# Patient Record
Sex: FEMALE | Race: WHITE | Employment: OTHER | URBAN - METROPOLITAN AREA
[De-identification: names, ages, dates, MRNs, and addresses within clinical notes are randomized per-mention and may not be internally consistent; named-entity substitution may affect disease eponyms.]

---

## 2018-09-20 ENCOUNTER — OFFICE VISIT (OUTPATIENT)
Dept: ORTHOPEDIC SURGERY | Age: 68
End: 2018-09-20

## 2018-09-20 VITALS
WEIGHT: 235 LBS | BODY MASS INDEX: 34.8 KG/M2 | HEIGHT: 69 IN | SYSTOLIC BLOOD PRESSURE: 134 MMHG | HEART RATE: 82 BPM | DIASTOLIC BLOOD PRESSURE: 61 MMHG

## 2018-09-20 DIAGNOSIS — M25.552 ACUTE HIP PAIN, LEFT: ICD-10-CM

## 2018-09-20 DIAGNOSIS — M25.512 ACUTE PAIN OF LEFT SHOULDER: Primary | ICD-10-CM

## 2018-09-20 PROCEDURE — 3017F COLORECTAL CA SCREEN DOC REV: CPT | Performed by: ORTHOPAEDIC SURGERY

## 2018-09-20 PROCEDURE — G8400 PT W/DXA NO RESULTS DOC: HCPCS | Performed by: ORTHOPAEDIC SURGERY

## 2018-09-20 PROCEDURE — 1101F PT FALLS ASSESS-DOCD LE1/YR: CPT | Performed by: ORTHOPAEDIC SURGERY

## 2018-09-20 PROCEDURE — 99213 OFFICE O/P EST LOW 20 MIN: CPT | Performed by: ORTHOPAEDIC SURGERY

## 2018-09-20 PROCEDURE — 1123F ACP DISCUSS/DSCN MKR DOCD: CPT | Performed by: ORTHOPAEDIC SURGERY

## 2018-09-20 PROCEDURE — 1036F TOBACCO NON-USER: CPT | Performed by: ORTHOPAEDIC SURGERY

## 2018-09-20 PROCEDURE — 4040F PNEUMOC VAC/ADMIN/RCVD: CPT | Performed by: ORTHOPAEDIC SURGERY

## 2018-09-20 PROCEDURE — G8427 DOCREV CUR MEDS BY ELIG CLIN: HCPCS | Performed by: ORTHOPAEDIC SURGERY

## 2018-09-20 PROCEDURE — 1090F PRES/ABSN URINE INCON ASSESS: CPT | Performed by: ORTHOPAEDIC SURGERY

## 2018-09-20 PROCEDURE — G8417 CALC BMI ABV UP PARAM F/U: HCPCS | Performed by: ORTHOPAEDIC SURGERY

## 2018-09-20 RX ORDER — LORATADINE 10 MG/1
10 CAPSULE, LIQUID FILLED ORAL DAILY
COMMUNITY
End: 2021-03-30

## 2018-09-20 RX ORDER — FUROSEMIDE 20 MG/1
20 TABLET ORAL DAILY
Qty: 7 TABLET | Refills: 1 | Status: SHIPPED | OUTPATIENT
Start: 2018-09-20 | End: 2019-08-13

## 2018-09-20 RX ORDER — TERBINAFINE HYDROCHLORIDE 250 MG/1
250 TABLET ORAL DAILY
COMMUNITY
End: 2020-08-25

## 2018-09-20 RX ORDER — OXYCODONE HYDROCHLORIDE AND ACETAMINOPHEN 5; 325 MG/1; MG/1
1 TABLET ORAL EVERY 6 HOURS PRN
Qty: 28 TABLET | Refills: 0 | Status: SHIPPED | OUTPATIENT
Start: 2018-09-20 | End: 2018-09-27

## 2018-09-20 RX ORDER — FLUOXETINE HYDROCHLORIDE 20 MG/1
40 CAPSULE ORAL DAILY
COMMUNITY
End: 2021-03-30

## 2018-09-20 RX ORDER — ARIPIPRAZOLE 2 MG/1
2 TABLET ORAL DAILY
COMMUNITY

## 2018-09-20 RX ORDER — LEVOTHYROXINE SODIUM 0.05 MG/1
50 TABLET ORAL DAILY
COMMUNITY

## 2018-09-20 NOTE — PROGRESS NOTES
Date:  2018    Name:  Danae Rosales    :  1950      Age:   80 y.o. Reason for Visit:     Arm Pain (Left humerus)      HPI: Danae Rosales is a 80 y.o. female here today for fall during cruise onto [place] shoulder and the left hip. Left hip with increase in pain since the trip on plane and through airport. Pain in the left shoudler associated with marked bruising. Pain in the left arm not assoicated with nerologic issues or head injry nor loc. .         Review of Systems:  Review of Systems   Cardiovascular: Positive for leg swelling. Negative for chest pain and palpitations. Genitourinary: Positive for flank pain. Right sided anterior chest pain probably from fall. Musculoskeletal: Positive for falls, joint pain, myalgias and neck pain. Review of Systems   Constitutional: Negative for chills and fever. HENT: Negative for nosebleeds. Eyes: Negative for double vision. Cardiovascular: Negative for chest pain. Gastrointestinal: Negative for abdominal pain. Musculoskeletal: Positive for joint pain and myalgias. Skin: Negative for rash. Neurological: Negative for seizures. Psychiatric/Behavioral: Negative for hallucinations.        Past History:  Past Medical History:   Diagnosis Date    Risk for falls 14    S/P tubal ligation 1990     Past Surgical History:   Procedure Laterality Date    FRACTURE SURGERY Bilateral 2008     Current Outpatient Prescriptions on File Prior to Visit   Medication Sig Dispense Refill    HYDROcodone-acetaminophen (NORCO) 5-325 MG per tablet Take 1 tablet by mouth 2 times daily as needed for Pain (ONE PO BID X 1WK AND ONE PO QD X  1WK) 21 tablet 0    HYDROcodone-acetaminophen (NORCO) 5-325 MG per tablet Take 1 tablet by mouth three times daily [facility] RX [id number] 90 tablet 0    HYDROcodone-acetaminophen (NORCO) 5-325 MG per tablet Take 1 tablet by mouth three times daily [facility] RX [id number]  Fill after 16 90 tablet 0

## 2018-09-25 ENCOUNTER — TELEPHONE (OUTPATIENT)
Dept: ORTHOPEDIC SURGERY | Age: 68
End: 2018-09-25

## 2018-09-27 ENCOUNTER — OFFICE VISIT (OUTPATIENT)
Dept: ORTHOPEDIC SURGERY | Age: 68
End: 2018-09-27

## 2018-09-27 VITALS
BODY MASS INDEX: 34.8 KG/M2 | HEART RATE: 82 BPM | HEIGHT: 69 IN | WEIGHT: 235 LBS | DIASTOLIC BLOOD PRESSURE: 59 MMHG | SYSTOLIC BLOOD PRESSURE: 134 MMHG

## 2018-09-27 DIAGNOSIS — M25.512 ACUTE PAIN OF LEFT SHOULDER: Primary | ICD-10-CM

## 2018-09-27 DIAGNOSIS — M25.552 ACUTE HIP PAIN, LEFT: ICD-10-CM

## 2018-09-27 PROCEDURE — 99024 POSTOP FOLLOW-UP VISIT: CPT | Performed by: ORTHOPAEDIC SURGERY

## 2018-09-27 RX ORDER — OXYCODONE HYDROCHLORIDE AND ACETAMINOPHEN 5; 325 MG/1; MG/1
1 TABLET ORAL EVERY 6 HOURS PRN
Qty: 28 TABLET | Refills: 0 | Status: SHIPPED | OUTPATIENT
Start: 2018-09-27 | End: 2018-10-04

## 2018-09-27 NOTE — PROGRESS NOTES
no guarding. Musculoskeletal: Patient exhibits tenderness and pain. Neurological: Patient is alert and oriented to person, place, and time. Skin: Skin is warm. Psychiatric: Affect normal.     Diagnostics:  XR SHOULDER LEFT (MIN 2 VIEWS)   Final Result      XR HIP 2-3 VW W PELVIS LEFT   Final Result          Assessment/Plan:   1. Acute pain of left shoulder   3 views of the left shoudler reviewed AP lateral and axillary views were reviewed and revealed severe osteoarrthritis and fracture of the greater troch and surgical neck. Min displaced. Slight lateral displacement of distal fragment compared to previous visit films    - XR SHOULDER LEFT (MIN 3 VIEWS)  - XR HIP LEFT (2-3 VIEWS)  - oxyCODONE-acetaminophen (PERCOCET) 5-325 MG per tablet; Take 1 tablet by mouth every 6 hours as needed for Pain for up to 7 days. Intended supply: 7 days. Take lowest dose possible to manage pain. Dispense: 28 tablet; Refill: 0  - furosemide (LASIX) 20 MG tablet; Take 1 tablet by mouth daily  Dispense: 7 tablet; Refill: 1  -follow-up in one week for repeat films    2. Acute hip pain, left   two views of the left hip AP and lateral with signs of moderate osteaorthritis, no sign of fracture or dislocation. Right toal hip appears to be doing well overall  - XR HIP LEFT (2-3 VIEWS)  - oxyCODONE-acetaminophen (PERCOCET) 5-325 MG per tablet; Take 1 tablet by mouth every 6 hours as needed for Pain for up to 7 days. Intended supply: 7 days. Take lowest dose possible to manage pain. Dispense: 28 tablet; Refill: 0  - furosemide (LASIX) 20 MG tablet; Take 1 tablet by mouth daily  Dispense: 7 tablet; Refill: 1  -follow-up in one week for repeat films    Plan for pendulum esercises. Gentle rom as tolerated recheck in another week with repat xrays. Hip and ribs appear to be doing well. Left leg with erythema  localized area appears more related to the swelling then infection and has decreased since last visit.      Distal

## 2018-09-28 ENCOUNTER — TELEPHONE (OUTPATIENT)
Dept: ORTHOPEDIC SURGERY | Age: 68
End: 2018-09-28

## 2018-10-01 RX ORDER — IBUPROFEN 800 MG/1
800 TABLET ORAL EVERY 6 HOURS PRN
Qty: 90 TABLET | Refills: 1 | Status: SHIPPED | OUTPATIENT
Start: 2018-10-01 | End: 2018-12-02 | Stop reason: SDUPTHER

## 2018-10-04 ENCOUNTER — OFFICE VISIT (OUTPATIENT)
Dept: ORTHOPEDIC SURGERY | Age: 68
End: 2018-10-04
Payer: MEDICARE

## 2018-10-04 VITALS
SYSTOLIC BLOOD PRESSURE: 129 MMHG | DIASTOLIC BLOOD PRESSURE: 52 MMHG | BODY MASS INDEX: 34.8 KG/M2 | HEART RATE: 92 BPM | HEIGHT: 69 IN | WEIGHT: 235 LBS

## 2018-10-04 DIAGNOSIS — S42.322D CLOSED DISPLACED TRANSVERSE FRACTURE OF SHAFT OF LEFT HUMERUS WITH ROUTINE HEALING, SUBSEQUENT ENCOUNTER: Primary | ICD-10-CM

## 2018-10-04 DIAGNOSIS — M25.512 ACUTE PAIN OF LEFT SHOULDER: ICD-10-CM

## 2018-10-04 PROCEDURE — G8400 PT W/DXA NO RESULTS DOC: HCPCS | Performed by: PHYSICIAN ASSISTANT

## 2018-10-04 PROCEDURE — 99214 OFFICE O/P EST MOD 30 MIN: CPT | Performed by: PHYSICIAN ASSISTANT

## 2018-10-04 PROCEDURE — G8417 CALC BMI ABV UP PARAM F/U: HCPCS | Performed by: PHYSICIAN ASSISTANT

## 2018-10-04 PROCEDURE — 1090F PRES/ABSN URINE INCON ASSESS: CPT | Performed by: PHYSICIAN ASSISTANT

## 2018-10-04 PROCEDURE — 4040F PNEUMOC VAC/ADMIN/RCVD: CPT | Performed by: PHYSICIAN ASSISTANT

## 2018-10-04 PROCEDURE — G8427 DOCREV CUR MEDS BY ELIG CLIN: HCPCS | Performed by: PHYSICIAN ASSISTANT

## 2018-10-04 PROCEDURE — 1036F TOBACCO NON-USER: CPT | Performed by: PHYSICIAN ASSISTANT

## 2018-10-04 PROCEDURE — 1101F PT FALLS ASSESS-DOCD LE1/YR: CPT | Performed by: PHYSICIAN ASSISTANT

## 2018-10-04 PROCEDURE — 3017F COLORECTAL CA SCREEN DOC REV: CPT | Performed by: PHYSICIAN ASSISTANT

## 2018-10-04 PROCEDURE — G8484 FLU IMMUNIZE NO ADMIN: HCPCS | Performed by: PHYSICIAN ASSISTANT

## 2018-10-04 PROCEDURE — 1123F ACP DISCUSS/DSCN MKR DOCD: CPT | Performed by: PHYSICIAN ASSISTANT

## 2018-10-04 RX ORDER — OXYCODONE HYDROCHLORIDE AND ACETAMINOPHEN 5; 325 MG/1; MG/1
1 TABLET ORAL EVERY 6 HOURS PRN
Qty: 28 TABLET | Refills: 0 | Status: SHIPPED | OUTPATIENT
Start: 2018-10-04 | End: 2018-10-15 | Stop reason: SDUPTHER

## 2018-10-04 NOTE — PROGRESS NOTES
Flexion: abnormal   External Rotation: abnormal   Internal Rotation 0 degrees: abnormal   Internal Rotation 90 degrees: abnormal     Muscle Strength   Right shoulder normal muscle strength: poor ffunction secondary to pain in the shoduler. Other   Erythema: present  Scars: absent  Sensation: normal  Pulse: present    Comments:  Difficulty with use of the arm but able to do some pendulum exercises. Left Shoulder Exam   Left shoulder exam is normal.               Physical Exam  Physical Exam   Constitutional: Patient is oriented to person, place, and time and well-developed, well-nourished, and in no distress. HENT:   Head: Normocephalic and atraumatic. Eyes: Pupils are equal, round, and reactive to light. Neck: No tracheal deviation present. No thyromegaly present. Pulmonary/Chest: Effort normal.   Abdominal: Soft. There is no guarding. Musculoskeletal: Patient exhibits tenderness and pain. Neurological: Patient is alert and oriented to person, place, and time. Skin: Skin is warm. Psychiatric: Affect normal.     Diagnostics:  XR SHOULDER LEFT (MIN 2 VIEWS)   Final Result          Assessment/Plan:   1. Acute pain of left shoulder   3 views of the left shoudler reviewed AP lateral and axillary views were reviewed and revealed severe osteoarrthritis and fracture of the greater troch and surgical neck. Min displaced. Slight Increase in lateral displacement of distal fragment compared to previous visit films from 9/27/18    - XR SHOULDER LEFT (MIN 3 VIEWS)    - oxyCODONE-acetaminophen (PERCOCET) 5-325 MG per tablet; Take 1 tablet by mouth every 6 hours as needed for Pain for up to 7 days. Intended supply: 7 days. Take lowest dose possible to manage pain. Dispense: 28 tablet; Refill: 0  - furosemide (LASIX) 20 MG tablet; Take 1 tablet by mouth daily  Dispense: 7 tablet;  Refill: 1  -follow-up in one week for repeat films  -referral placed for evaluation and treatment with Dr. Ashley Blake  -Plan for

## 2018-10-09 ENCOUNTER — OFFICE VISIT (OUTPATIENT)
Dept: ORTHOPEDIC SURGERY | Age: 68
End: 2018-10-09
Payer: MEDICARE

## 2018-10-09 VITALS — WEIGHT: 235 LBS | HEIGHT: 69 IN | BODY MASS INDEX: 34.8 KG/M2

## 2018-10-09 DIAGNOSIS — M25.512 ACUTE PAIN OF LEFT SHOULDER: ICD-10-CM

## 2018-10-09 DIAGNOSIS — S42.222B: Primary | ICD-10-CM

## 2018-10-09 PROCEDURE — 3017F COLORECTAL CA SCREEN DOC REV: CPT | Performed by: ORTHOPAEDIC SURGERY

## 2018-10-09 PROCEDURE — G8484 FLU IMMUNIZE NO ADMIN: HCPCS | Performed by: ORTHOPAEDIC SURGERY

## 2018-10-09 PROCEDURE — 99214 OFFICE O/P EST MOD 30 MIN: CPT | Performed by: ORTHOPAEDIC SURGERY

## 2018-10-09 PROCEDURE — G8417 CALC BMI ABV UP PARAM F/U: HCPCS | Performed by: ORTHOPAEDIC SURGERY

## 2018-10-09 PROCEDURE — G8427 DOCREV CUR MEDS BY ELIG CLIN: HCPCS | Performed by: ORTHOPAEDIC SURGERY

## 2018-10-09 PROCEDURE — 1101F PT FALLS ASSESS-DOCD LE1/YR: CPT | Performed by: ORTHOPAEDIC SURGERY

## 2018-10-09 PROCEDURE — 1090F PRES/ABSN URINE INCON ASSESS: CPT | Performed by: ORTHOPAEDIC SURGERY

## 2018-10-10 ENCOUNTER — TELEPHONE (OUTPATIENT)
Dept: ORTHOPEDIC SURGERY | Age: 68
End: 2018-10-10

## 2018-10-10 DIAGNOSIS — S42.202A CLOSED FRACTURE OF PROXIMAL END OF LEFT HUMERUS, UNSPECIFIED FRACTURE MORPHOLOGY, INITIAL ENCOUNTER: Primary | ICD-10-CM

## 2018-10-11 ENCOUNTER — HOSPITAL ENCOUNTER (OUTPATIENT)
Dept: PHYSICAL THERAPY | Age: 68
Setting detail: THERAPIES SERIES
Discharge: HOME | End: 2018-10-11
Payer: MEDICARE

## 2018-10-11 PROCEDURE — 97110 THERAPEUTIC EXERCISES: CPT | Performed by: PHYSICAL THERAPIST

## 2018-10-11 PROCEDURE — G8985 CARRY GOAL STATUS: HCPCS | Performed by: PHYSICAL THERAPIST

## 2018-10-11 PROCEDURE — 97112 NEUROMUSCULAR REEDUCATION: CPT | Performed by: PHYSICAL THERAPIST

## 2018-10-11 PROCEDURE — G8984 CARRY CURRENT STATUS: HCPCS | Performed by: PHYSICAL THERAPIST

## 2018-10-11 PROCEDURE — 97161 PT EVAL LOW COMPLEX 20 MIN: CPT | Performed by: PHYSICAL THERAPIST

## 2018-10-11 NOTE — PLAN OF CARE
(): At least 1 percent but less than 20 percent impaired, limited or restricted    Pain Scale: 0-6/10  Easing factors: ibuprofen 800, at night percocet, ice  Provocative factors: reaching, pushing,     Type: []Constant   [x]Intermittent  [x]Radiating to elbow []Localized []other:     Numbness/Tingling: none     Occupation/School:retired     Living Status/Prior Level of Function: Independent with ADLs and IADLs, self care, do own hair    OBJECTIVE:     R handed    ROM 10/11 PROM AROM  Comment    L R L R    Flexion ~110 ~160      Abduction ~95 ~160      ER ~20 at 80abd ~90      IR [place]@[employer] ~25@80Abd      Other(cervical)        Other             Strength not tested 10/11 L R Comment   Flexion      Abduction      ER      IR      Supraspinatus      Upper Trap      Lower Trap      Mid Trap      Rhomboids      Biceps      Triceps      Horizontal Abduction      Horizontal Adduction      Lats        Special Tests Results/Comment   Senait    Neers    Speeds    OBriens    Other    Neurologic Signs Neg 10/11   Functional Reach      Reflexes/Sensation:    [x]Dermatomes/Myotomes intact 10/11   []Reflexes equal and normal bilaterally   []Other:    Joint mobility:    []Normal    []Hypo   []Hyper    Palpation: general TTP L shoulder area 10/11    Functional Mobility/Transfers: increased timing and some compensations due to L shoulder and hip injury from recent fall 10/11    Posture: protective posture L UE and L LE due to recent fall and injuries sustained. 10/11    Bandages/Dressings/Incisions: none 10/11    Gait: (include devices/WB status): antalgic with straight cane use due to L hip injury sustained with the recent fall. 10/11                         [x] Patient history, allergies, meds reviewed. Medical chart reviewed. See intake form. 10/11     Review Of Systems (ROS):  [x]Performed Review of systems (Integumentary, CardioPulmonary, Neurological) by intake and observation.  Intake form has been scanned into medical record. Patient has been instructed to contact their primary care physician regarding ROS issues if not already being addressed at this time. Co-morbidities/Complexities (which will affect course of rehabilitation): 10/11  []None           Arthritic conditions   []Rheumatoid arthritis (M05.9)  [x]Osteoarthritis (M19.91)   Cardiovascular conditions   []Hypertension (I10)  []Hyperlipidemia (E78.5)  []Angina pectoris (I20)  []Atherosclerosis (I70)   Musculoskeletal conditions   []Disc pathology   []Congenital spine pathologies   []Prior surgical intervention  []Osteoporosis (M81.8)  []Osteopenia (M85.8)   Endocrine conditions   []Hypothyroid (E03.9)  []Hyperthyroid Gastrointestinal conditions   []Constipation (B16.24)   Metabolic conditions   []Morbid obesity (E66.01)  [x]Diabetes type 1(E10.65) or 2 (E11.65)   []Neuropathy (G60.9)     Pulmonary conditions   []Asthma (J45)  []Coughing   []COPD (J44.9)   Psychological Disorders  []Anxiety (F41.9)  []Depression (F32.9)   []Other:   []Other:          Barriers to/and or personal factors that will affect rehab potential:              [x]Age  [x]Sex              [x]Motivation/Lack of Motivation                        [x]Co-Morbidities              []Cognitive Function, education/learning barriers              []Environmental, home barriers              []profession/work barriers  [x]past PT/medical experience  []other:  Justification: above factors may affect rehab potential    Falls Risk Assessment (30 days): not tested. Pt to continue use of straight cane until walking is back to PLOF 10/11  [] Falls Risk assessed and no intervention required.   [] Falls Risk assessed and Patient requires intervention due to being higher risk   TUG score (>12s at risk):     [] Falls education provided, including       G-Codes:  PT G-Codes  Functional Assessment Tool Used: UEFS  Score: 84%  Functional Limitation: Carrying, moving and handling objects  Carrying, Moving and

## 2018-10-11 NOTE — FLOWSHEET NOTE
Biceps     Triceps          PRE's     Flexion     Abduction     External Rotation     Internal Rotation     Shrugs     EXT     Reverse Flys     Serratus     Horizontal Abd with ER     Biceps     Triceps     Retraction          Cable Column/Theraband     External Rotation     Internal Rotation     Shrugs     Lats     Ext     Flex     Scapular Retraction     BIC     TRIC     PNF          Dynamic Stability          Plyoback          Manual interventions                     Therapeutic Exercise and NMR EXR  [x] (09696) Provided verbal/tactile cueing for activities related to strengthening, flexibility, endurance, ROM  for improvements in scapular, scapulothoracic and UE control with self care, reaching, carrying, lifting, house/yardwork, driving/computer work.    [] (70095) Provided verbal/tactile cueing for activities related to improving balance, coordination, kinesthetic sense, posture, motor skill, proprioception  to assist with  scapular, scapulothoracic and UE control with self care, reaching, carrying, lifting, house/yardwork, driving/computer work. Therapeutic Activities:    [] (19526 or 29442) Provided verbal/tactile cueing for activities related to improving balance, coordination, kinesthetic sense, posture, motor skill, proprioception and motor activation to allow for proper function of scapular, scapulothoracic and UE control with self care, carrying, lifting, driving/computer work.      Home Exercise Program:    [x] (16011) Reviewed/Progressed HEP activities related to strengthening, flexibility, endurance, ROM of scapular, scapulothoracic and UE control with self care, reaching, carrying, lifting, house/yardwork, driving/computer work  [] (43553) Reviewed/Progressed HEP activities related to improving balance, coordination, kinesthetic sense, posture, motor skill, proprioception of scapular, scapulothoracic and UE control with self care, reaching, carrying, lifting, house/yardwork, driving/computer work      Manual Treatments:  PROM / STM / Oscillations-Mobs:  G-I, II, III, IV (PA's, Inf., Post.)  [] (29248) Provided manual therapy to mobilize soft tissue/joints of cervical/CT, scapular GHJ and UE for the purpose of modulating pain, promoting relaxation,  increasing ROM, reducing/eliminating soft tissue swelling/inflammation/restriction, improving soft tissue extensibility and allowing for proper ROM for normal function with self care, reaching, carrying, lifting, house/yardwork, driving/computer work    Modalities:      Charges:  Timed Code Treatment Minutes: 30   Total Treatment Minutes: 60       [x] EVAL (LOW) 19449 (typically 20 minutes face-to-face)  [] EVAL (MOD) 29048 (typically 30 minutes face-to-face)  [] EVAL (HIGH) 45177 (typically 45 minutes face-to-face)  [] RE-EVAL     [x] SO(54023) x  1   [] IONTO  [x] NMR (66151) x  1   [] VASO  [] Manual (86678) x       [] Other:  [] TA x       [] Mech Traction (43220)  [] ES(attended) (23434)      [] ES (un) (23971):     GOALS:  Patient stated goal: increase shoulder ROM and strength, able to due her hair      Therapist goals for Patient:   Short Term Goals: To be achieved in: 2 weeks  1. Independent in HEP and progression per patient tolerance, in order to prevent re-injury. 2. Patient will have a decrease in pain to facilitate improvement in movement, function, and ADLs as indicated by Functional Deficits.     Long Term Goals: To be achieved in:   1. Disability index score of 50% or less for the UEFS to assist with reaching prior level of function. 4-6 weeks 20% 12 weeks  2. Patient will demonstrate increased AROM to [facility] to allow for proper joint functioning as indicated by patients Functional Deficits. 8-12 weeks  3. Patient will demonstrate an increase in Strength to good scapular and core control  for UE to allow for proper functional mobility as indicated by patients Functional Deficits. 8-12 weeks  4.  Patient will return to  functional activities

## 2018-10-12 ENCOUNTER — TELEPHONE (OUTPATIENT)
Dept: ORTHOPEDIC SURGERY | Age: 68
End: 2018-10-12

## 2018-10-12 DIAGNOSIS — S42.322D CLOSED DISPLACED TRANSVERSE FRACTURE OF SHAFT OF LEFT HUMERUS WITH ROUTINE HEALING, SUBSEQUENT ENCOUNTER: ICD-10-CM

## 2018-10-15 RX ORDER — OXYCODONE HYDROCHLORIDE AND ACETAMINOPHEN 5; 325 MG/1; MG/1
1 TABLET ORAL EVERY 6 HOURS PRN
Qty: 28 TABLET | Refills: 0 | Status: SHIPPED | OUTPATIENT
Start: 2018-10-15 | End: 2018-10-22

## 2018-10-17 ENCOUNTER — HOSPITAL ENCOUNTER (OUTPATIENT)
Dept: PHYSICAL THERAPY | Age: 68
Setting detail: THERAPIES SERIES
Discharge: HOME | End: 2018-10-17
Payer: MEDICARE

## 2018-10-17 PROCEDURE — 97112 NEUROMUSCULAR REEDUCATION: CPT

## 2018-10-17 PROCEDURE — 97110 THERAPEUTIC EXERCISES: CPT

## 2018-10-17 NOTE — FLOWSHEET NOTE
Functional goals:  [] Patient is progressing as expected towards functional goals listed. [] Progression is slowed due to complexities listed. [] Progression has been slowed due to co-morbidities. [x] Plan just implemented, too soon to assess goals progression  [] Other:     ASSESSMENT:  See eval    Treatment/Activity Tolerance:  [x] Patient tolerated treatment fair to well [] Patient limited by fatique  [x] Patient limited by pain  [] Patient limited by other medical complications  [x] Other: 10/17 - somewhat limited by pain this date, however, able to complete all exercises. Held supine flexion to 90d d/t pain when moving past 90d of flexion. Began ER AAROM and pulleys for AAROM into flexion and abduction. Patient education: 10/11 Reviewed diagnosis, POC, HEP and its importance. Prognosis: [x] Good for established goals  [] Fair  [] Poor    Patient Requires Follow-up: [x] Yes  [] No    PLAN: 1-2 days per week for 4 Weeks:10/11-11/11/2018  [x] Continue per plan of care [] Alter current plan (see comments)  [] Plan of care initiated [] Hold pending MD visit [] Discharge    Electronically signed by: Dominga Alva PT,   *If patient does not return for further follow ups after this date. Please consider this as the patients discharge from physical therapy.

## 2018-10-20 NOTE — PROGRESS NOTES
(CLARITIN) 10 MG capsule Take 10 mg by mouth daily      FLUoxetine (PROZAC) 20 MG capsule Take 20 mg by mouth daily      lisdexamfetamine (VYVANSE) 50 MG capsule Take 50 mg by mouth every morning. Arty Reap ARIPiprazole (ABILIFY) 2 MG tablet Take 2 mg by mouth daily      terbinafine (LAMISIL) 250 MG tablet Take 250 mg by mouth daily      levothyroxine (SYNTHROID) 50 MCG tablet Take 50 mcg by mouth Daily      furosemide (LASIX) 20 MG tablet Take 1 tablet by mouth daily 7 tablet 1    HYDROcodone-acetaminophen (NORCO) 5-325 MG per tablet Take 1 tablet by mouth 2 times daily as needed for Pain (ONE PO BID X 1WK AND ONE PO QD X  1WK) 21 tablet 0    HYDROcodone-acetaminophen (NORCO) 5-325 MG per tablet Take 1 tablet by mouth three times daily [facility] RX [id number] 90 tablet 0    HYDROcodone-acetaminophen (NORCO) 5-325 MG per tablet Take 1 tablet by mouth three times daily [facility] RX [id number]  Fill after 6/9/16 90 tablet 0     No current facility-administered medications on file prior to visit. Review of Systems:  Pertinent items are noted in HPI  Review of systems reviewed from Patient History Form dated on Today October 19, 2018 and available in the patient's chart under the Media tab. Vital Signs: There were no vitals filed for this visit. General Exam:   Constitutional: Patient is adequately groomed with no evidence of malnutrition  Mental Status: The patient is oriented to time, place and person. The patient's mood and affect are appropriate. Neurological: The patient has good coordination. There is no weakness or sensory deficit. She appears her stated age and is in no apparent distress. Left Shoulder Examination:    Inspection: The arm is removed out of the sling. There is no deformity or residual bruising. No signs of any acute trauma. Palpation:  No crepitation with gentle range of motion. Tenderness over the tuberosity. There is tenderness over the posterior joint line.     Active Range of

## 2018-10-22 ENCOUNTER — HOSPITAL ENCOUNTER (OUTPATIENT)
Dept: PHYSICAL THERAPY | Age: 68
Setting detail: THERAPIES SERIES
Discharge: HOME | End: 2018-10-22
Payer: MEDICARE

## 2018-10-22 PROCEDURE — 97110 THERAPEUTIC EXERCISES: CPT

## 2018-10-22 PROCEDURE — 97112 NEUROMUSCULAR REEDUCATION: CPT

## 2018-10-22 NOTE — FLOWSHEET NOTE
[facility] [address] [place] [address]  Phone: [phone]   Fax:     [phone]    Physical Therapy Daily Treatment Note  Date:  10/22/2018    Patient Name:  Susi Morris    :  1950  MRN: [id number]  Restrictions/Precautions:    Medical/Treatment Diagnosis Information:  Diagnosis: U52.094A (ICD-10-CM) - Closed fracture of proximal end of left humerus, unspecified fracture morphology, initial encounter  Treatment Diagnosis: L shoulder pain, L shoulder decreased ROM and strength  Insurance/Certification information:  PT Insurance Information: Medicare  Physician Information:  Referring Practitioner: Siri Kussmaul of care signed (Y/N):     Date of Patient follow up with Physician:     G-Code (if applicable):      Date G-Code Applied:  10/11 84%       Progress Note:   Next due by: Visit #10  Or week of 2018     Latex Allergy:  [x]NO      []YEs  Preferred Language for Healthcare:   [x]English       []other:    Visit # Insurance Allowable   3 medicare     Pain level: At eval - 0-6      10/22 - 2/10    SUBJECTIVE:    10/22 - every since Wednesday since she was doing her exercises, her arm hurt a lot. She has continued her pendulums and given the ER a break because she feels like this made her shoulder hurt worse.      OR handed             ROM 10/11 PROM AROM  Comment     L R L R     Flexion ~110 ~160         Abduction ~95 ~160         ER ~20 at 80abd ~90         IR [place]@[employer] ~25@54LOT         Other(cervical)             Other                    Strength not tested 10/11 L R Comment   Flexion         Abduction         ER         IR         Supraspinatus         Upper Trap         Lower Trap         Mid Trap         Rhomboids         Biceps         Triceps         Horizontal Abduction         Horizontal Adduction         Lats            Special Tests Results/Comment   Senait Schaefer

## 2018-10-25 ENCOUNTER — HOSPITAL ENCOUNTER (OUTPATIENT)
Dept: PHYSICAL THERAPY | Age: 68
Setting detail: THERAPIES SERIES
Discharge: HOME | End: 2018-10-25
Payer: MEDICARE

## 2018-10-25 PROCEDURE — 97110 THERAPEUTIC EXERCISES: CPT

## 2018-10-25 PROCEDURE — 97112 NEUROMUSCULAR REEDUCATION: CPT

## 2018-11-06 ENCOUNTER — HOSPITAL ENCOUNTER (OUTPATIENT)
Dept: PHYSICAL THERAPY | Age: 68
Setting detail: THERAPIES SERIES
Discharge: HOME | End: 2018-11-06
Payer: MEDICARE

## 2018-11-06 PROCEDURE — 97110 THERAPEUTIC EXERCISES: CPT

## 2018-11-06 PROCEDURE — 97112 NEUROMUSCULAR REEDUCATION: CPT

## 2018-11-06 NOTE — FLOWSHEET NOTE
[address], [address] [place] [address]  Phone: [phone]   Fax:     [phone]    Physical Therapy Daily Treatment Note  Date:  2018    Patient Name:  Siobhan Coleman    :  1950  MRN: [id number]  Restrictions/Precautions:    Medical/Treatment Diagnosis Information:  Diagnosis: R65.384M (ICD-10-CM) - Closed fracture of proximal end of left humerus, unspecified fracture morphology, initial encounter  Treatment Diagnosis: L shoulder pain, L shoulder decreased ROM and strength  Insurance/Certification information:  PT Insurance Information: Medicare  Physician Information:  Referring Practitioner: Caleb Posey of care signed (Y/N):     Date of Patient follow up with Physician:     G-Code (if applicable):      Date G-Code Applied:  10/11 84%       Progress Note:   Next due by: Visit #10  Or week of 2018     Latex Allergy:  [x]NO      []YEs  Preferred Language for Healthcare:   [x]English       []other:    Visit # Insurance Allowable   5 medicare     Pain level: At eval - 0-6       - 2/10    SUBJECTIVE:       - had a long drive to [place] over the weekend. This may have flared up her shoulder a little bit.      OR handed             ROM 10/11 PROM AROM  Comment     L R L R     Flexion ~110 ~160         Abduction ~95 ~160         ER ~20 at 80abd ~90         IR [place]@[employer] ~52@77PIK         Other(cervical)             Other                    Strength not tested 10/11 L R Comment   Flexion         Abduction         ER         IR         Supraspinatus         Upper Trap         Lower Trap         Mid Trap         Rhomboids         Biceps         Triceps         Horizontal Abduction         Horizontal Adduction         Lats            Special Tests Results/Comment   Senait     Neers     Speeds     OBriens     Other     Neurologic Signs Neg 10/11   Functional Reach        Reflexes/Sensation:

## 2018-11-07 ENCOUNTER — OFFICE VISIT (OUTPATIENT)
Dept: ORTHOPEDIC SURGERY | Age: 68
End: 2018-11-07
Payer: MEDICARE

## 2018-11-07 VITALS
HEIGHT: 69 IN | WEIGHT: 235 LBS | SYSTOLIC BLOOD PRESSURE: 113 MMHG | DIASTOLIC BLOOD PRESSURE: 59 MMHG | HEART RATE: 86 BPM | BODY MASS INDEX: 34.8 KG/M2

## 2018-11-07 DIAGNOSIS — S42.322D CLOSED DISPLACED TRANSVERSE FRACTURE OF SHAFT OF LEFT HUMERUS WITH ROUTINE HEALING, SUBSEQUENT ENCOUNTER: Primary | ICD-10-CM

## 2018-11-07 DIAGNOSIS — M19.012 PRIMARY OSTEOARTHRITIS OF LEFT SHOULDER: ICD-10-CM

## 2018-11-07 PROCEDURE — G8427 DOCREV CUR MEDS BY ELIG CLIN: HCPCS | Performed by: ORTHOPAEDIC SURGERY

## 2018-11-07 PROCEDURE — 3017F COLORECTAL CA SCREEN DOC REV: CPT | Performed by: ORTHOPAEDIC SURGERY

## 2018-11-07 PROCEDURE — 99213 OFFICE O/P EST LOW 20 MIN: CPT | Performed by: ORTHOPAEDIC SURGERY

## 2018-11-07 PROCEDURE — 4040F PNEUMOC VAC/ADMIN/RCVD: CPT | Performed by: ORTHOPAEDIC SURGERY

## 2018-11-07 PROCEDURE — 1123F ACP DISCUSS/DSCN MKR DOCD: CPT | Performed by: ORTHOPAEDIC SURGERY

## 2018-11-07 PROCEDURE — G8400 PT W/DXA NO RESULTS DOC: HCPCS | Performed by: ORTHOPAEDIC SURGERY

## 2018-11-07 PROCEDURE — 1036F TOBACCO NON-USER: CPT | Performed by: ORTHOPAEDIC SURGERY

## 2018-11-07 PROCEDURE — G8417 CALC BMI ABV UP PARAM F/U: HCPCS | Performed by: ORTHOPAEDIC SURGERY

## 2018-11-07 PROCEDURE — 1090F PRES/ABSN URINE INCON ASSESS: CPT | Performed by: ORTHOPAEDIC SURGERY

## 2018-11-07 PROCEDURE — 1101F PT FALLS ASSESS-DOCD LE1/YR: CPT | Performed by: ORTHOPAEDIC SURGERY

## 2018-11-07 PROCEDURE — G8484 FLU IMMUNIZE NO ADMIN: HCPCS | Performed by: ORTHOPAEDIC SURGERY

## 2018-11-07 NOTE — PROGRESS NOTES
History of Present Illness: Livia James is a pleasant 80 y.o. female who is here for a follow up of her left shoulder. She was referred by Dr. Zack Tsai for consultation regarding persistent left shoulder pain and stiffness. She injured herself on September 14 while on a cruise ship. She fell while walking and lurched forward to brace the fall. She was diagnosed by the Ship doctor and her left arm was placed in a sling. She has had some previous problems with that shoulder for a couple of years and has been told she has shoulder osteoarthritis.     Upon her return to [address] [place] [address] she saw Dr. Helga Dakin and continued with conservative treatment. He has been concerned that the fracture may be shifting and wanted to see me for consideration of surgery. She is a retired  for the [employer]. He has pain with attempts to move the arm. She has applied ice and took pain medicines initially. She had received a cortisone injection for ongoing shoulder problems back in February 2018. Patient denies any recent injury, fever, chills, or any other problems. We have initiated conservative treatment for her humerus fracture. She does not present with her sling today. Patient notes pain is a 3 out of 10. Medical History:  Patient's medications, allergies, past medical, surgical, social and family histories were reviewed and updated as appropriate. Pertinent items are noted in HPI  Review of systems reviewed from Patient History Form dated on 9/20/2018 and available in the patient's chart under the Media tab. Vital Signs:  Vitals:    11/07/18 1103   BP: (!) 113/59   Pulse: 86         Constitutional: In no apparent distress. Normal affect. Alert and oriented X3 and is cooperative. LEFT Shoulder Examination:     Inspection:  There is no deformity or residual bruising. No signs of any acute trauma.     Palpation:  No crepitation with gentle range of motion.   Tenderness over the

## 2018-11-13 ENCOUNTER — HOSPITAL ENCOUNTER (OUTPATIENT)
Dept: PHYSICAL THERAPY | Age: 68
Setting detail: THERAPIES SERIES
Discharge: HOME | End: 2018-11-13
Payer: MEDICARE

## 2018-11-13 PROCEDURE — 97110 THERAPEUTIC EXERCISES: CPT | Performed by: PHYSICAL THERAPIST

## 2018-11-13 PROCEDURE — 97530 THERAPEUTIC ACTIVITIES: CPT | Performed by: PHYSICAL THERAPIST

## 2018-11-15 ENCOUNTER — HOSPITAL ENCOUNTER (OUTPATIENT)
Dept: PHYSICAL THERAPY | Age: 68
Setting detail: THERAPIES SERIES
Discharge: HOME | End: 2018-11-15
Payer: MEDICARE

## 2018-11-15 PROCEDURE — 97110 THERAPEUTIC EXERCISES: CPT

## 2018-11-15 PROCEDURE — 97140 MANUAL THERAPY 1/> REGIONS: CPT

## 2018-11-15 NOTE — FLOWSHEET NOTE
[address], [address], [address] [place] [address]  Phone: [phone]   Fax:     [phone]    Physical Therapy Daily Treatment Note  Date:  11/15/2018    Patient Name:  Amna Reyes    :  1950  MRN: [id number]  Restrictions/Precautions:    Medical/Treatment Diagnosis Information:  Diagnosis: V56.840N (ICD-10-CM) - Closed fracture of proximal end of left humerus, unspecified fracture morphology, initial encounter  Treatment Diagnosis: L shoulder pain, L shoulder decreased ROM and strength  Insurance/Certification information:  PT Insurance Information: Medicare  Physician Information:  Referring Practitioner: Caleb Posey of care signed (Y/N):     Date of Patient follow up with Physician:     G-Code (if applicable):      Date G-Code Applied:  10/11 84%       Progress Note:   Next due by: Visit #10  Or week of 2018     Latex Allergy:  [x]NO      []YEs  Preferred Language for Healthcare:   [x]English       []other:    Visit # Insurance Allowable   7  POC NEXT VISIT medicare     Pain level: At eval - 0-6      11/15 - 0-2/10    SUBJECTIVE:      11/15  Not feeling too bad. Did get some soreness from IR behind the back.      OR handed             ROM 10/11 PROM AROM  Comment     L R L R     Flexion ~110 ~160         Abduction ~95 ~160         ER ~20 at 80abd ~90         IR Ella@[employer] ~98@79PLQ         Other(cervical)             Other                    Strength not tested 10/11 L R Comment   Flexion         Abduction         ER         IR         Supraspinatus         Upper Trap         Lower Trap         Mid Trap         Rhomboids         Biceps         Triceps         Horizontal Abduction         Horizontal Adduction         Lats            Special Tests Results/Comment   Perrin-Artemio     Neers     Speeds     OBriens     Other     Neurologic Signs Neg 10/11   Functional Reach        Reflexes/Sensation:

## 2018-11-20 ENCOUNTER — OFFICE VISIT (OUTPATIENT)
Dept: ORTHOPEDIC SURGERY | Age: 68
End: 2018-11-20
Payer: MEDICARE

## 2018-11-20 VITALS
SYSTOLIC BLOOD PRESSURE: 131 MMHG | DIASTOLIC BLOOD PRESSURE: 72 MMHG | WEIGHT: 235 LBS | BODY MASS INDEX: 34.8 KG/M2 | HEIGHT: 69 IN | HEART RATE: 80 BPM

## 2018-11-20 DIAGNOSIS — S72.002G CLOSED FRACTURE OF LEFT HIP WITH DELAYED HEALING, SUBSEQUENT ENCOUNTER: ICD-10-CM

## 2018-11-20 DIAGNOSIS — M25.552 ACUTE HIP PAIN, LEFT: Primary | ICD-10-CM

## 2018-11-20 PROBLEM — S72.002A CLOSED FRACTURE OF LEFT HIP (HCC): Status: ACTIVE | Noted: 2018-11-20

## 2018-11-20 PROCEDURE — 99214 OFFICE O/P EST MOD 30 MIN: CPT | Performed by: ORTHOPAEDIC SURGERY

## 2018-11-20 PROCEDURE — G8417 CALC BMI ABV UP PARAM F/U: HCPCS | Performed by: ORTHOPAEDIC SURGERY

## 2018-11-20 PROCEDURE — G8400 PT W/DXA NO RESULTS DOC: HCPCS | Performed by: ORTHOPAEDIC SURGERY

## 2018-11-20 PROCEDURE — 3017F COLORECTAL CA SCREEN DOC REV: CPT | Performed by: ORTHOPAEDIC SURGERY

## 2018-11-20 PROCEDURE — 1123F ACP DISCUSS/DSCN MKR DOCD: CPT | Performed by: ORTHOPAEDIC SURGERY

## 2018-11-20 PROCEDURE — G8427 DOCREV CUR MEDS BY ELIG CLIN: HCPCS | Performed by: ORTHOPAEDIC SURGERY

## 2018-11-20 PROCEDURE — 1090F PRES/ABSN URINE INCON ASSESS: CPT | Performed by: ORTHOPAEDIC SURGERY

## 2018-11-20 PROCEDURE — 1101F PT FALLS ASSESS-DOCD LE1/YR: CPT | Performed by: ORTHOPAEDIC SURGERY

## 2018-11-20 PROCEDURE — G8484 FLU IMMUNIZE NO ADMIN: HCPCS | Performed by: ORTHOPAEDIC SURGERY

## 2018-11-20 PROCEDURE — 1036F TOBACCO NON-USER: CPT | Performed by: ORTHOPAEDIC SURGERY

## 2018-11-20 PROCEDURE — 4040F PNEUMOC VAC/ADMIN/RCVD: CPT | Performed by: ORTHOPAEDIC SURGERY

## 2018-11-20 NOTE — PROGRESS NOTES
Acute hip pain, left  - gave patient referral for aqua therapy for the left hip pain  - advised her patient to follow up in early January for reevaluation at which point we will repeat x-rays of the left hip   - gave patient a handicap placard for 6 months. - Advised patient she can continue to use the oxycodone as needed for pain relief. Also advised use of over-the-counter NSAIDs and anti-inflammatories such as Tylenol to help with pain as needed as well. Approxiaately 15 minutes minutes for discussion and counseling.                  Electronically signed by AMADO Melgar on 11/20/2018 at 3:59 PM

## 2018-11-21 ENCOUNTER — HOSPITAL ENCOUNTER (OUTPATIENT)
Dept: PHYSICAL THERAPY | Age: 68
Setting detail: THERAPIES SERIES
Discharge: HOME | End: 2018-11-21
Payer: MEDICARE

## 2018-11-21 PROCEDURE — 97140 MANUAL THERAPY 1/> REGIONS: CPT | Performed by: PHYSICAL THERAPIST

## 2018-11-21 PROCEDURE — G8985 CARRY GOAL STATUS: HCPCS | Performed by: PHYSICAL THERAPIST

## 2018-11-21 PROCEDURE — 97110 THERAPEUTIC EXERCISES: CPT | Performed by: PHYSICAL THERAPIST

## 2018-11-21 PROCEDURE — G8984 CARRY CURRENT STATUS: HCPCS | Performed by: PHYSICAL THERAPIST

## 2018-11-21 NOTE — PLAN OF CARE
The [address] [place] [address],[address], [address], [address] [place] [address]  Phone: [phone]   Fax:     [phone]   Physical Therapy Re-Certification Plan of Care    Dear Dr Fanny Bamberger  ,    We had the pleasure of treating the following patient for physical therapy services at [address] [place] [address]. A summary of our findings can be found in the updated assessment below. This includes our plan of care. If you have any questions or concerns regarding these findings, please do not hesitate to contact me at the office phone number checked above. Thank you for the referral.     Physician Signature:________________________________Date:__________________  By signing above (or electronic signature), therapists plan is approved by physician      Overall Response to Treatment:   [x]Patient is responding gradually to treatment and improvement is noted with regards  to goals   []Patient should continue to improve in reasonable time if they continue HEP   []Patient has plateaued and is no longer responding to skilled PT intervention    []Patient is getting worse and would benefit from return to referring MD   []Patient unable to adhere to initial POC   [x]Other: need to continue to increase ROM and strength to allow for pt to return to PLOF.       Date range of previous POC Visits: 10/11-    Total Visits: 8          Physical Therapy Daily Treatment Note  Date:  2018    Patient Name:  Valentina Stokes    :  1950  MRN: [id number]  Restrictions/Precautions:    Medical/Treatment Diagnosis Information:  Diagnosis: K30.495L (ICD-10-CM) - Closed fracture of proximal end of left humerus, unspecified fracture morphology, initial encounter  Treatment Diagnosis: L shoulder pain, L shoulder decreased ROM and strength  Insurance/Certification information:  PT Insurance Information: Medicare  Physician Information:  Referring Practitioner: Killian Cash

## 2018-11-26 ENCOUNTER — HOSPITAL ENCOUNTER (OUTPATIENT)
Dept: PHYSICAL THERAPY | Age: 68
Setting detail: THERAPIES SERIES
Discharge: HOME | End: 2018-11-26
Payer: MEDICARE

## 2018-11-26 PROCEDURE — 97110 THERAPEUTIC EXERCISES: CPT | Performed by: PHYSICAL THERAPIST

## 2018-11-26 PROCEDURE — 97140 MANUAL THERAPY 1/> REGIONS: CPT | Performed by: PHYSICAL THERAPIST

## 2018-11-26 NOTE — FLOWSHEET NOTE
[facility], [facility], [address] [employer] [address] [place] [address], [address] [place] [address]  Phone: [phone]   Fax:     [phone]        Physical Therapy Daily Treatment Note  Date:  2018    Patient Name:  Ezequiel Burnett    :  1950  MRN: [id number]  Restrictions/Precautions:    Medical/Treatment Diagnosis Information:  Diagnosis: E34.741J (ICD-10-CM) - Closed fracture of proximal end of left humerus, unspecified fracture morphology, initial encounter  Treatment Diagnosis: L shoulder pain, L shoulder decreased ROM and strength  Insurance/Certification information:  PT Insurance Information: Medicare  Physician Information:  Referring Practitioner: Caleb Posey of care signed (Y/N):     Date of Patient follow up with Physician:     G-Code (if applicable):      Date G-Code Applied:   50%       Progress Note:   Next due by: Visit #20  Or week of 2019    Latex Allergy:  [x]NO      []YEs  Preferred Language for Healthcare:   [x]English       []other:    Visit # Insurance Allowable   9   medicare     Pain level: At eval - 0-6       - 3.5/10    SUBJECTIVE:        Reaching behind back increase pain post exercise.     OR handed             ROM  PROM AROM  Comment     L R L R     Flexion ~140 ~160         Abduction ~95 ~160         ER ~65 at 80abd ~90         IR Dillon@[employer] ~25@80Abd         Other(cervical)             Other                    Strength    L R Comment   Flexion  ~80*   2/5       Abduction  ~60* with  2/5 +shrug        ER         IR         Supraspinatus         Upper Trap         Lower Trap         Mid Trap         Rhomboids         Biceps         Triceps         Horizontal Abduction         Horizontal Adduction         Lats            Special Tests Results/Comment   Senait     Neers     Speeds     OBriens     Other     Neurologic Signs Neg 10/11   Functional Reach        Reflexes/Sensation: Rotation Rows w/ TB 3x10, black TB ^11/15   Biceps     Triceps          PRE's     Flexion Supine 2x10 start11/21   Abduction SL 2x10 start11/21   External Rotation     Internal Rotation     Shrugs     EXT     Reverse Flys     Serratus     Horizontal Abd with ER     Biceps     Triceps     Retraction          Cable Column/Theraband     External Rotation @ 0 3x10, red TB Start 11/6   Internal Rotation @ 0 3x10, green TB Start 11/6   No money  2x10 yellow 18\" loop start11/21   Lats     Ext     Flex     Scapular Retraction     BIC     TRIC     PNF          Dynamic Stability          Plyoback          Manual interventions     PROM flexion,scap and ER  10 min ^11/26              Therapeutic Exercise and NMR EXR  [x] (20216) Provided verbal/tactile cueing for activities related to strengthening, flexibility, endurance, ROM  for improvements in scapular, scapulothoracic and UE control with self care, reaching, carrying, lifting, house/yardwork, driving/computer work.    [] (44443) Provided verbal/tactile cueing for activities related to improving balance, coordination, kinesthetic sense, posture, motor skill, proprioception  to assist with  scapular, scapulothoracic and UE control with self care, reaching, carrying, lifting, house/yardwork, driving/computer work. Therapeutic Activities:    [x] (81105 or 88737) Provided verbal/tactile cueing for activities related to improving balance, coordination, kinesthetic sense, posture, motor skill, proprioception and motor activation to allow for proper function of scapular, scapulothoracic and UE control with self care, carrying, lifting, driving/computer work.      Home Exercise Program:    [x] (59897) Reviewed/Progressed HEP activities related to strengthening, flexibility, endurance, ROM of scapular, scapulothoracic and UE control with self care, reaching, carrying, lifting, house/yardwork, driving/computer work  [] (20347) Reviewed/Progressed HEP activities related to improving

## 2018-11-29 ENCOUNTER — HOSPITAL ENCOUNTER (OUTPATIENT)
Dept: PHYSICAL THERAPY | Age: 68
Setting detail: THERAPIES SERIES
Discharge: HOME | End: 2018-11-29
Payer: MEDICARE

## 2018-11-29 PROCEDURE — 97140 MANUAL THERAPY 1/> REGIONS: CPT | Performed by: PHYSICAL THERAPIST

## 2018-11-29 PROCEDURE — 97110 THERAPEUTIC EXERCISES: CPT | Performed by: PHYSICAL THERAPIST

## 2018-12-05 ENCOUNTER — OFFICE VISIT (OUTPATIENT)
Dept: ORTHOPEDIC SURGERY | Age: 68
End: 2018-12-05
Payer: MEDICARE

## 2018-12-05 VITALS
WEIGHT: 235 LBS | SYSTOLIC BLOOD PRESSURE: 150 MMHG | DIASTOLIC BLOOD PRESSURE: 70 MMHG | BODY MASS INDEX: 34.8 KG/M2 | HEART RATE: 74 BPM | HEIGHT: 69 IN

## 2018-12-05 DIAGNOSIS — M19.112 POST-TRAUMATIC OSTEOARTHRITIS OF LEFT SHOULDER: ICD-10-CM

## 2018-12-05 DIAGNOSIS — M25.519 SHOULDER PAIN, UNSPECIFIED CHRONICITY, UNSPECIFIED LATERALITY: ICD-10-CM

## 2018-12-05 DIAGNOSIS — S42.202A CLOSED FRACTURE OF PROXIMAL END OF LEFT HUMERUS, UNSPECIFIED FRACTURE MORPHOLOGY, INITIAL ENCOUNTER: Primary | ICD-10-CM

## 2018-12-05 PROCEDURE — 1123F ACP DISCUSS/DSCN MKR DOCD: CPT | Performed by: ORTHOPAEDIC SURGERY

## 2018-12-05 PROCEDURE — G8417 CALC BMI ABV UP PARAM F/U: HCPCS | Performed by: ORTHOPAEDIC SURGERY

## 2018-12-05 PROCEDURE — 4040F PNEUMOC VAC/ADMIN/RCVD: CPT | Performed by: ORTHOPAEDIC SURGERY

## 2018-12-05 PROCEDURE — 1090F PRES/ABSN URINE INCON ASSESS: CPT | Performed by: ORTHOPAEDIC SURGERY

## 2018-12-05 PROCEDURE — 1036F TOBACCO NON-USER: CPT | Performed by: ORTHOPAEDIC SURGERY

## 2018-12-05 PROCEDURE — G8400 PT W/DXA NO RESULTS DOC: HCPCS | Performed by: ORTHOPAEDIC SURGERY

## 2018-12-05 PROCEDURE — 1101F PT FALLS ASSESS-DOCD LE1/YR: CPT | Performed by: ORTHOPAEDIC SURGERY

## 2018-12-05 PROCEDURE — G8484 FLU IMMUNIZE NO ADMIN: HCPCS | Performed by: ORTHOPAEDIC SURGERY

## 2018-12-05 PROCEDURE — 3017F COLORECTAL CA SCREEN DOC REV: CPT | Performed by: ORTHOPAEDIC SURGERY

## 2018-12-05 PROCEDURE — G8427 DOCREV CUR MEDS BY ELIG CLIN: HCPCS | Performed by: ORTHOPAEDIC SURGERY

## 2018-12-05 PROCEDURE — 99213 OFFICE O/P EST LOW 20 MIN: CPT | Performed by: ORTHOPAEDIC SURGERY

## 2018-12-05 RX ORDER — TRAMADOL HYDROCHLORIDE 50 MG/1
50 TABLET ORAL EVERY 6 HOURS PRN
Qty: 20 TABLET | Refills: 0 | Status: SHIPPED | OUTPATIENT
Start: 2018-12-05 | End: 2018-12-10

## 2018-12-05 RX ORDER — IBUPROFEN 800 MG/1
600 TABLET ORAL 3 TIMES DAILY PRN
Qty: 90 TABLET | Refills: 0 | Status: ON HOLD | OUTPATIENT
Start: 2018-12-05 | End: 2019-08-21 | Stop reason: HOSPADM

## 2018-12-06 ENCOUNTER — HOSPITAL ENCOUNTER (OUTPATIENT)
Dept: PHYSICAL THERAPY | Age: 68
Setting detail: THERAPIES SERIES
Discharge: HOME | End: 2018-12-06
Payer: MEDICARE

## 2018-12-06 PROCEDURE — 97110 THERAPEUTIC EXERCISES: CPT | Performed by: PHYSICAL THERAPIST

## 2018-12-06 PROCEDURE — 97140 MANUAL THERAPY 1/> REGIONS: CPT | Performed by: PHYSICAL THERAPIST

## 2018-12-06 NOTE — FLOWSHEET NOTE
[address], [address], [address] [place] [address]  Phone: [phone]   Fax:     [phone]        Physical Therapy Daily Treatment Note  Date:  2018    Patient Name:  Amna Reyes    :  1950  MRN: [id number]  Restrictions/Precautions:    Medical/Treatment Diagnosis Information:  Diagnosis: Y34.533O (ICD-10-CM) - Closed fracture of proximal end of left humerus, unspecified fracture morphology, initial encounter  Treatment Diagnosis: L shoulder pain, L shoulder decreased ROM and strength  Insurance/Certification information:  PT Insurance Information: Medicare  Physician Information:  Referring Practitioner: Caleb Posey of care signed (Y/N):     Date of Patient follow up with Physician:     G-Code (if applicable):      Date G-Code Applied:   50%       Progress Note:   Next due by: Visit #20  Or week of 2019    Latex Allergy:  [x]NO      []YEs  Preferred Language for Healthcare:   [x]English       []other:    Visit # Insurance Allowable   11   medicare     Pain level:   At eval - 0-6       - 2/10    SUBJECTIVE:       MD said dc PT at end of Dec    OR handed             ROM  PROM AROM  Comment     L R L R     Flexion ~140 ~160         Abduction ~95 ~160         ER ~65 at 80abd ~90         IR Aláraegan@[employer] ~25@80Abd         Other(cervical)             Other                    Strength    L R Comment   Flexion  ~80*   2/5       Abduction  ~60* with  2/5 +shrug        ER         IR         Supraspinatus         Upper Trap         Lower Trap         Mid Trap         Rhomboids         Biceps         Triceps         Horizontal Abduction         Horizontal Adduction         Lats            Special Tests Results/Comment   Senait     Neers     Speeds     OBriens     Other     Neurologic Signs Neg 10/11   Functional Reach        Reflexes/Sensation:               [x]Dermatomes/Myotomes intact Supine 2x10 start11/21   Abduction SL 2x10 start11/21   External Rotation SL 2x10 start11/29   Internal Rotation     Shrugs     EXT     Reverse Flys     Serratus     Horizontal Abd with ER     Biceps     Triceps     Retraction          Cable Column/Theraband     External Rotation @ 0 3x10, red TB Start 11/6   Internal Rotation @ 0 3x10, green TB Start 11/6   No money  2x10 yellow 18\" loop start11/21   Lats     Ext     Flex     Scapular Retraction     BIC     TRIC     PNF          Dynamic Stability          Plyoback          Manual interventions     PROM flexion,scap and ER  10 min ^12/6              Therapeutic Exercise and NMR EXR  [x] (81325) Provided verbal/tactile cueing for activities related to strengthening, flexibility, endurance, ROM  for improvements in scapular, scapulothoracic and UE control with self care, reaching, carrying, lifting, house/yardwork, driving/computer work.    [] (86247) Provided verbal/tactile cueing for activities related to improving balance, coordination, kinesthetic sense, posture, motor skill, proprioception  to assist with  scapular, scapulothoracic and UE control with self care, reaching, carrying, lifting, house/yardwork, driving/computer work. Therapeutic Activities:    [x] (28759 or 41433) Provided verbal/tactile cueing for activities related to improving balance, coordination, kinesthetic sense, posture, motor skill, proprioception and motor activation to allow for proper function of scapular, scapulothoracic and UE control with self care, carrying, lifting, driving/computer work.      Home Exercise Program:    [x] (76469) Reviewed/Progressed HEP activities related to strengthening, flexibility, endurance, ROM of scapular, scapulothoracic and UE control with self care, reaching, carrying, lifting, house/yardwork, driving/computer work  [] (07178) Reviewed/Progressed HEP activities related to improving balance, coordination, kinesthetic sense, posture, motor skill, proprioception of scapular, scapulothoracic and UE control with self care, reaching, carrying, lifting, house/yardwork, driving/computer work      Manual Treatments:  PROM / STM / Oscillations-Mobs:  G-I, II, III, IV (PA's, Inf., Post.)  [x] (64066) Provided manual therapy to mobilize soft tissue/joints of cervical/CT, scapular GHJ and UE for the purpose of modulating pain, promoting relaxation,  increasing ROM, reducing/eliminating soft tissue swelling/inflammation/restriction, improving soft tissue extensibility and allowing for proper ROM for normal function with self care, reaching, carrying, lifting, house/yardwork, driving/computer work    Modalities:    12/6 - declined ice    Charges:  Timed Code Treatment Minutes: 40   Total Treatment Minutes: 3185-4556       [] EVAL (LOW) 05837 (typically 20 minutes face-to-face)  [] EVAL (MOD) 17784 (typically 30 minutes face-to-face)  [] EVAL (HIGH) 24667 (typically 45 minutes face-to-face)  [] RE-EVAL     [x] JN(93712) x  2   [] IONTO  [] NMR (51618) x      [] VASO  [x] Manual (17118) x  1    [] Other:  [] TA x       [] Mech Traction (27766)  [] ES(attended) (79355)      [] ES (un) (26854):     GOALS:  Patient stated goal: increase shoulder ROM and strength, able to due her hair      Therapist goals for Patient:   Short Term Goals: To be achieved in: 2 weeks  1. Independent in HEP and progression per patient tolerance, in order to prevent re-injury. MET  2. Patient will have a decrease in pain to facilitate improvement in movement, function, and ADLs as indicated by Functional Deficits. MET     Long Term Goals: To be achieved in:   1. Disability index score of 50% or less for the UEFS to assist with reaching prior level of function. 4-6 weeksPROGRESSING TOWARDS 20% 12 weeks  2. Patient will demonstrate increased AROM to [facility] to allow for proper joint functioning as indicated by patients Functional Deficits. 8-12 weeks  PROGRESSING TOWARDS  3.  Patient will demonstrate an

## 2018-12-10 ENCOUNTER — HOSPITAL ENCOUNTER (OUTPATIENT)
Dept: PHYSICAL THERAPY | Age: 68
Setting detail: THERAPIES SERIES
Discharge: HOME | End: 2018-12-10
Payer: MEDICARE

## 2018-12-10 PROCEDURE — 97110 THERAPEUTIC EXERCISES: CPT | Performed by: PHYSICAL THERAPIST

## 2018-12-10 PROCEDURE — 97140 MANUAL THERAPY 1/> REGIONS: CPT | Performed by: PHYSICAL THERAPIST

## 2018-12-10 NOTE — FLOWSHEET NOTE
[address], [address], [address] [place] [address]  Phone: [phone]   Fax:     [phone]        Physical Therapy Daily Treatment Note  Date:  12/10/2018    Patient Name:  Elisabeth Santamaria    :  1950  MRN: [id number]  Restrictions/Precautions:    Medical/Treatment Diagnosis Information:  Diagnosis: O99.708O (ICD-10-CM) - Closed fracture of proximal end of left humerus, unspecified fracture morphology, initial encounter  Treatment Diagnosis: L shoulder pain, L shoulder decreased ROM and strength  Insurance/Certification information:  PT Insurance Information: Medicare  Physician Information:  Referring Practitioner: Caleb Posey of care signed (Y/N):     Date of Patient follow up with Physician:     G-Code (if applicable):      Date G-Code Applied:   50%       Progress Note:   Next due by: Visit #20  Or week of 2019    Latex Allergy:  [x]NO      []YEs  Preferred Language for Healthcare:   [x]English       []other:    Visit # Insurance Allowable   12   medicare     Pain level: At eval - 0-6       - 2/10    SUBJECTIVE:      12/10 little more sore. Omid [place] area.   Help 13# grand baby a lot yesterday      OR handed             ROM  PROM AROM  Comment     L R L R     Flexion ~140 ~160         Abduction ~95 ~160         ER ~65 at 80abd ~90         IR Magno@[employer] ~25@80Abd         Other(cervical)             Other                    Strength    L R Comment   Flexion  ~80*   2/5       Abduction  ~60* with  2/5 +shrug        ER         IR         Supraspinatus         Upper Trap         Lower Trap         Mid Trap         Rhomboids         Biceps         Triceps         Horizontal Abduction         Horizontal Adduction         Lats            Special Tests Results/Comment   Senait     Nejay jay     Speeds     OBriens     Other     Neurologic Signs Neg 10/11   Functional Reach        Reflexes/Sensation: sense, posture, motor skill, proprioception of scapular, scapulothoracic and UE control with self care, reaching, carrying, lifting, house/yardwork, driving/computer work      Manual Treatments:  PROM / STM / Oscillations-Mobs:  G-I, II, III, IV (PA's, Inf., Post.)  [x] (42148) Provided manual therapy to mobilize soft tissue/joints of cervical/CT, scapular GHJ and UE for the purpose of modulating pain, promoting relaxation,  increasing ROM, reducing/eliminating soft tissue swelling/inflammation/restriction, improving soft tissue extensibility and allowing for proper ROM for normal function with self care, reaching, carrying, lifting, house/yardwork, driving/computer work    Modalities:    12/10 - declined ice    Charges:  Timed Code Treatment Minutes: 40   Total Treatment Minutes: 2966-0851       [] EVAL (LOW) 96997 (typically 20 minutes face-to-face)  [] EVAL (MOD) 97885 (typically 30 minutes face-to-face)  [] EVAL (HIGH) 66893 (typically 45 minutes face-to-face)  [] RE-EVAL     [x] NQ(94614) x  2   [] IONTO  [] NMR (17685) x      [] VASO  [x] Manual (92617) x  1    [] Other:  [] TA x       [] Mech Traction (90589)  [] ES(attended) (50394)      [] ES (un) (60071):     GOALS:  Patient stated goal: increase shoulder ROM and strength, able to due her hair      Therapist goals for Patient:   Short Term Goals: To be achieved in: 2 weeks  1. Independent in HEP and progression per patient tolerance, in order to prevent re-injury. MET  2. Patient will have a decrease in pain to facilitate improvement in movement, function, and ADLs as indicated by Functional Deficits. MET     Long Term Goals: To be achieved in:   1. Disability index score of 50% or less for the UEFS to assist with reaching prior level of function. 4-6 weeksPROGRESSING TOWARDS 20% 12 weeks  2. Patient will demonstrate increased AROM to [facility] to allow for proper joint functioning as indicated by patients Functional Deficits. 8-12 weeks  PROGRESSING TOWARDS  3.

## 2018-12-12 RX ORDER — IBUPROFEN 800 MG/1
800 TABLET ORAL EVERY 6 HOURS PRN
Qty: 90 TABLET | Refills: 1 | Status: SHIPPED | OUTPATIENT
Start: 2018-12-12 | End: 2019-08-13

## 2018-12-13 ENCOUNTER — HOSPITAL ENCOUNTER (OUTPATIENT)
Dept: PHYSICAL THERAPY | Age: 68
Setting detail: THERAPIES SERIES
Discharge: HOME | End: 2018-12-13
Payer: MEDICARE

## 2018-12-13 PROCEDURE — 97110 THERAPEUTIC EXERCISES: CPT | Performed by: PHYSICAL THERAPIST

## 2018-12-13 PROCEDURE — 97140 MANUAL THERAPY 1/> REGIONS: CPT | Performed by: PHYSICAL THERAPIST

## 2018-12-13 NOTE — FLOWSHEET NOTE
[facility] 97, [address] [place] [address], [address] [place] [address]  Phone: [phone]   Fax:     [phone]        Physical Therapy Daily Treatment Note  Date:  2018    Patient Name:  Gwen Montes De Oca    :  1950  MRN: [id number]  Restrictions/Precautions:    Medical/Treatment Diagnosis Information:  Diagnosis: I30.834O (ICD-10-CM) - Closed fracture of proximal end of left humerus, unspecified fracture morphology, initial encounter  Treatment Diagnosis: L shoulder pain, L shoulder decreased ROM and strength  Insurance/Certification information:  PT Insurance Information: Medicare  Physician Information:  Referring Practitioner: Caleb Posey of care signed (Y/N):     Date of Patient follow up with Physician:     G-Code (if applicable):      Date G-Code Applied:   50%       Progress Note:   Next due by: Visit #20  Or week of 2019    Latex Allergy:  [x]NO      []YEs  Preferred Language for Healthcare:   [x]English       []other:    Visit # Insurance Allowable   13   medicare     Pain level:   At eval - 0-6       - 1/10    SUBJECTIVE:         No new c/o      OR handed             ROM  PROM AROM  Comment     L R L R     Flexion ~140 ~160         Abduction ~95 ~160         ER ~65 at 80abd ~90         IR [email or web address] ~25@80Abd         Other(cervical)             Other                    Strength    L R Comment   Flexion  ~80*   2/5       Abduction  ~60* with  2/5 +shrug        ER         IR         Supraspinatus         Upper Trap         Lower Trap         Mid Trap         Rhomboids         Biceps         Triceps         Horizontal Abduction         Horizontal Adduction         Lats            Special Tests Results/Comment   Senait     Neers     Speeds     OBriens     Other     Neurologic Signs Neg 10/11   Functional Reach        Reflexes/Sensation:               [x]Dermatomes/Myotomes intact 10/11 ^12/10   Abduction SL 3x10 ^12/10   External Rotation SL 3x10 ^12/10   Internal Rotation     Shrugs     EXT     Reverse Flys     Serratus     Horizontal Abd with ER     Biceps     Triceps     Retraction          Cable Column/Theraband     External Rotation @ 0 3x10, red TB Start 11/6   Internal Rotation @ 0 3x10, green TB Start 11/6   No money  2x10 yellow 18\" loop start11/21   Lats     Ext     Flex     Scapular Retraction     BIC     TRIC     PNF          Dynamic Stability          Plyoback          Manual interventions     PROM flexion,scap and ER  10 min ^12/10              Therapeutic Exercise and NMR EXR  [x] (07257) Provided verbal/tactile cueing for activities related to strengthening, flexibility, endurance, ROM  for improvements in scapular, scapulothoracic and UE control with self care, reaching, carrying, lifting, house/yardwork, driving/computer work.    [] (22804) Provided verbal/tactile cueing for activities related to improving balance, coordination, kinesthetic sense, posture, motor skill, proprioception  to assist with  scapular, scapulothoracic and UE control with self care, reaching, carrying, lifting, house/yardwork, driving/computer work. Therapeutic Activities:    [x] (23266 or 81165) Provided verbal/tactile cueing for activities related to improving balance, coordination, kinesthetic sense, posture, motor skill, proprioception and motor activation to allow for proper function of scapular, scapulothoracic and UE control with self care, carrying, lifting, driving/computer work.      Home Exercise Program:    [x] (93738) Reviewed/Progressed HEP activities related to strengthening, flexibility, endurance, ROM of scapular, scapulothoracic and UE control with self care, reaching, carrying, lifting, house/yardwork, driving/computer work  [] (81701) Reviewed/Progressed HEP activities related to improving balance, coordination, kinesthetic sense, posture, motor skill, proprioception of scapular,

## 2018-12-18 ENCOUNTER — OFFICE VISIT (OUTPATIENT)
Dept: ORTHOPEDIC SURGERY | Age: 68
End: 2018-12-18
Payer: MEDICARE

## 2018-12-18 VITALS
BODY MASS INDEX: 34.8 KG/M2 | HEIGHT: 69 IN | HEART RATE: 86 BPM | SYSTOLIC BLOOD PRESSURE: 133 MMHG | WEIGHT: 235 LBS | DIASTOLIC BLOOD PRESSURE: 61 MMHG

## 2018-12-18 DIAGNOSIS — S72.002G CLOSED FRACTURE OF LEFT HIP WITH DELAYED HEALING, SUBSEQUENT ENCOUNTER: ICD-10-CM

## 2018-12-18 DIAGNOSIS — M25.552 ACUTE HIP PAIN, LEFT: Primary | ICD-10-CM

## 2018-12-18 PROCEDURE — 4040F PNEUMOC VAC/ADMIN/RCVD: CPT | Performed by: ORTHOPAEDIC SURGERY

## 2018-12-18 PROCEDURE — 3017F COLORECTAL CA SCREEN DOC REV: CPT | Performed by: ORTHOPAEDIC SURGERY

## 2018-12-18 PROCEDURE — G8400 PT W/DXA NO RESULTS DOC: HCPCS | Performed by: ORTHOPAEDIC SURGERY

## 2018-12-18 PROCEDURE — G8417 CALC BMI ABV UP PARAM F/U: HCPCS | Performed by: ORTHOPAEDIC SURGERY

## 2018-12-18 PROCEDURE — 1090F PRES/ABSN URINE INCON ASSESS: CPT | Performed by: ORTHOPAEDIC SURGERY

## 2018-12-18 PROCEDURE — 1123F ACP DISCUSS/DSCN MKR DOCD: CPT | Performed by: ORTHOPAEDIC SURGERY

## 2018-12-18 PROCEDURE — 99214 OFFICE O/P EST MOD 30 MIN: CPT | Performed by: ORTHOPAEDIC SURGERY

## 2018-12-18 PROCEDURE — G8427 DOCREV CUR MEDS BY ELIG CLIN: HCPCS | Performed by: ORTHOPAEDIC SURGERY

## 2018-12-18 PROCEDURE — G8484 FLU IMMUNIZE NO ADMIN: HCPCS | Performed by: ORTHOPAEDIC SURGERY

## 2018-12-18 PROCEDURE — 1036F TOBACCO NON-USER: CPT | Performed by: ORTHOPAEDIC SURGERY

## 2018-12-18 PROCEDURE — 1101F PT FALLS ASSESS-DOCD LE1/YR: CPT | Performed by: ORTHOPAEDIC SURGERY

## 2018-12-18 RX ORDER — GABAPENTIN 100 MG/1
100 CAPSULE ORAL 3 TIMES DAILY
Qty: 90 CAPSULE | Refills: 5 | Status: SHIPPED | OUTPATIENT
Start: 2018-12-18 | End: 2019-07-16

## 2018-12-18 RX ORDER — PREDNISONE 10 MG/1
10 TABLET ORAL DAILY
Qty: 25 TABLET | Refills: 0 | Status: SHIPPED | OUTPATIENT
Start: 2018-12-18 | End: 2018-12-28

## 2018-12-18 NOTE — PROGRESS NOTES
file.    Allergies:  No Known Allergies    Physical Exam:  There were no vitals filed for this visit. General: Artemio Galicia is a healthy and well appearing 80y.o. year old female who is sitting comfortably in our office in noacute distress. Alert and oriented. Physical Exam:  Right Hip Exam   Right hip exam is normal.       Left Hip Exam   Left hip exam is normal.    Tenderness   The patient is experiencing tenderness in the greater trochanter and anterior. Range of Motion   The patient has normal left hip ROM. Muscle Strength   The patient has normal left hip strength. Tests   MARY: negative    Other   Erythema: absent  Pulse: present    Comments:  Local pain over the lateral hip but not directly over the greater troch. More muscular pain. Right Shoulder Exam     Tenderness   The patient is experiencing tenderness in the acromioclavicular joint and biceps tendon (tender over the proixmal humerus. ). Range of Motion   Active Abduction: abnormal   Passive Abduction: abnormal   Extension: abnormal   Forward Flexion: abnormal   External Rotation: abnormal   Internal Rotation 0 degrees: abnormal   Internal Rotation 90 degrees: abnormal     Muscle Strength   Right shoulder normal muscle strength: poor ffunction secondary to pain in the shoduler. Other   Erythema: present  Scars: absent  Sensation: normal  Pulse: present    Comments:  Difficulty with use of the arm but able to do some pendulum exercises. Left Shoulder Exam   Left shoulder exam is normal.               Physical Exam  Physical Exam   Constitutional: Patient is oriented to person, place, and time and well-developed, well-nourished, and in no distress. HENT:   Head: Normocephalic and atraumatic. Eyes: Pupils are equal, round, and reactive to light. Neck: No tracheal deviation present. No thyromegaly present. Pulmonary/Chest: Effort normal.   Abdominal: Soft. There is no guarding.    Musculoskeletal: Patient

## 2018-12-21 ENCOUNTER — HOSPITAL ENCOUNTER (OUTPATIENT)
Dept: PHYSICAL THERAPY | Age: 68
Setting detail: THERAPIES SERIES
Discharge: HOME | End: 2018-12-21
Payer: MEDICARE

## 2018-12-21 PROCEDURE — 97110 THERAPEUTIC EXERCISES: CPT | Performed by: PHYSICAL THERAPIST

## 2018-12-21 PROCEDURE — 97140 MANUAL THERAPY 1/> REGIONS: CPT | Performed by: PHYSICAL THERAPIST

## 2018-12-21 NOTE — FLOWSHEET NOTE
scapulothoracic and UE control with self care, reaching, carrying, lifting, house/yardwork, driving/computer work      Manual Treatments:  PROM / STM / Oscillations-Mobs:  G-I, II, III, IV (PA's, Inf., Post.)  [x] (18854) Provided manual therapy to mobilize soft tissue/joints of cervical/CT, scapular GHJ and UE for the purpose of modulating pain, promoting relaxation,  increasing ROM, reducing/eliminating soft tissue swelling/inflammation/restriction, improving soft tissue extensibility and allowing for proper ROM for normal function with self care, reaching, carrying, lifting, house/yardwork, driving/computer work    Modalities:    12/21 - declined ice    Charges:  Timed Code Treatment Minutes: 40   Total Treatment Minutes: 140-250       [] EVAL (LOW) 21903 (typically 20 minutes face-to-face)  [] EVAL (MOD) 09181 (typically 30 minutes face-to-face)  [] EVAL (HIGH) 75288 (typically 45 minutes face-to-face)  [] RE-EVAL     [x] EG(07569) x  2   [] IONTO  [] NMR (57616) x      [] VASO  [x] Manual (73245) x  1    [] Other:  [] TA x       [] Mech Traction (76099)  [] ES(attended) (67003)      [] ES (un) (51155):     GOALS:  Patient stated goal: increase shoulder ROM and strength, able to due her hair      Therapist goals for Patient:   Short Term Goals: To be achieved in: 2 weeks  1. Independent in HEP and progression per patient tolerance, in order to prevent re-injury. MET  2. Patient will have a decrease in pain to facilitate improvement in movement, function, and ADLs as indicated by Functional Deficits. MET     Long Term Goals: To be achieved in:   1. Disability index score of 50% or less for the UEFS to assist with reaching prior level of function. 4-6 weeksPROGRESSING TOWARDS 20% 12 weeks  2. Patient will demonstrate increased AROM to [facility] to allow for proper joint functioning as indicated by patients Functional Deficits. 8-12 weeks  PROGRESSING TOWARDS  3.  Patient will demonstrate an increase in Strength to good scapular and core control  for UE to allow for proper functional mobility as indicated by patients Functional Deficits. 8-12 weeksNOT MET  4. Patient will return to  functional activities without increased symptoms or restriction. Light adls 4-6 weeks heavy adls 7-31 weeksNOT MET  Progression Towards Functional goals:  [x] Patient is progressing as expected towards functional goals listed. [] Progression is slowed due to complexities listed. [] Progression has been slowed due to co-morbidities. [] Plan just implemented, too soon to assess goals progression  [] Other:     ASSESSMENT:  See eval    Treatment/Activity Tolerance:  [x] Patient tolerated treatment  well 12/21 [] Patient limited by fatique  [x] Patient limited by pain  [] Patient limited by other medical complications  [x] Other: need to continue to increase AROM and L UE shoulder strength as tolerated 12/21    Patient education:   10/11- Reviewed diagnosis, POC, HEP and its importance. Prognosis: [x] Good for established goals  [] Fair  [] Poor    Patient Requires Follow-up: [x] Yes  [] No    PLAN: 1-2 days per week for 4 Weeks:11/21-1/2/2019  [x] Continue per plan of care [] Alter current plan (see comments)  [] Plan of care initiated [] Hold pending MD visit [] Discharge    Electronically signed by: Stacy Frankel PT,   *If patient does not return for further follow ups after this date. Please consider this as the patients discharge from physical therapy.

## 2018-12-27 ENCOUNTER — HOSPITAL ENCOUNTER (OUTPATIENT)
Dept: PHYSICAL THERAPY | Age: 68
Setting detail: THERAPIES SERIES
Discharge: HOME | End: 2018-12-27
Payer: MEDICARE

## 2018-12-27 PROCEDURE — 97140 MANUAL THERAPY 1/> REGIONS: CPT | Performed by: PHYSICAL THERAPIST

## 2018-12-27 PROCEDURE — 97110 THERAPEUTIC EXERCISES: CPT | Performed by: PHYSICAL THERAPIST

## 2018-12-27 NOTE — FLOWSHEET NOTE
related to improving balance, coordination, kinesthetic sense, posture, motor skill, proprioception of scapular, scapulothoracic and UE control with self care, reaching, carrying, lifting, house/yardwork, driving/computer work      Manual Treatments:  PROM / STM / Oscillations-Mobs:  G-I, II, III, IV (PA's, Inf., Post.)  [x] (80446) Provided manual therapy to mobilize soft tissue/joints of cervical/CT, scapular GHJ and UE for the purpose of modulating pain, promoting relaxation,  increasing ROM, reducing/eliminating soft tissue swelling/inflammation/restriction, improving soft tissue extensibility and allowing for proper ROM for normal function with self care, reaching, carrying, lifting, house/yardwork, driving/computer work    Modalities:    12/27 - declined ice    Charges:  Timed Code Treatment Minutes: 40   Total Treatment Minutes: 5905-7896       [] EVAL (LOW) 86207 (typically 20 minutes face-to-face)  [] EVAL (MOD) 76545 (typically 30 minutes face-to-face)  [] EVAL (HIGH) 05010 (typically 45 minutes face-to-face)  [] RE-EVAL     [x] CO(07095) x  2   [] IONTO  [] NMR (68820) x      [] VASO  [x] Manual (83004) x  1    [] Other:  [] TA x       [] Mech Traction (90792)  [] ES(attended) (43535)      [] ES (un) (82196):     GOALS:  Patient stated goal: increase shoulder ROM and strength, able to due her hair      Therapist goals for Patient:   Short Term Goals: To be achieved in: 2 weeks  1. Independent in HEP and progression per patient tolerance, in order to prevent re-injury. MET  2. Patient will have a decrease in pain to facilitate improvement in movement, function, and ADLs as indicated by Functional Deficits. MET     Long Term Goals: To be achieved in:   1. Disability index score of 50% or less for the UEFS to assist with reaching prior level of function. 4-6 weeksPROGRESSING TOWARDS 20% 12 weeks  2.  Patient will demonstrate increased AROM to [facility] to allow for proper joint functioning as indicated by patients

## 2019-01-02 ENCOUNTER — HOSPITAL ENCOUNTER (OUTPATIENT)
Dept: PHYSICAL THERAPY | Age: 69
Setting detail: THERAPIES SERIES
Discharge: HOME | End: 2019-01-02
Payer: MEDICARE

## 2019-01-02 PROCEDURE — G8985 CARRY GOAL STATUS: HCPCS | Performed by: PHYSICAL THERAPIST

## 2019-01-02 PROCEDURE — 97140 MANUAL THERAPY 1/> REGIONS: CPT | Performed by: PHYSICAL THERAPIST

## 2019-01-02 PROCEDURE — G8984 CARRY CURRENT STATUS: HCPCS | Performed by: PHYSICAL THERAPIST

## 2019-01-02 PROCEDURE — G8986 CARRY D/C STATUS: HCPCS | Performed by: PHYSICAL THERAPIST

## 2019-01-02 PROCEDURE — 97110 THERAPEUTIC EXERCISES: CPT | Performed by: PHYSICAL THERAPIST

## 2019-01-16 ENCOUNTER — OFFICE VISIT (OUTPATIENT)
Dept: ORTHOPEDIC SURGERY | Age: 69
End: 2019-01-16
Payer: MEDICARE

## 2019-01-16 VITALS
HEIGHT: 69 IN | WEIGHT: 235.01 LBS | DIASTOLIC BLOOD PRESSURE: 82 MMHG | BODY MASS INDEX: 34.81 KG/M2 | HEART RATE: 86 BPM | SYSTOLIC BLOOD PRESSURE: 135 MMHG

## 2019-01-16 DIAGNOSIS — S42.322D CLOSED DISPLACED TRANSVERSE FRACTURE OF SHAFT OF LEFT HUMERUS WITH ROUTINE HEALING, SUBSEQUENT ENCOUNTER: Primary | ICD-10-CM

## 2019-01-16 DIAGNOSIS — M19.012 PRIMARY OSTEOARTHRITIS OF LEFT SHOULDER: ICD-10-CM

## 2019-01-16 PROCEDURE — 1123F ACP DISCUSS/DSCN MKR DOCD: CPT | Performed by: ORTHOPAEDIC SURGERY

## 2019-01-16 PROCEDURE — 1090F PRES/ABSN URINE INCON ASSESS: CPT | Performed by: ORTHOPAEDIC SURGERY

## 2019-01-16 PROCEDURE — G8484 FLU IMMUNIZE NO ADMIN: HCPCS | Performed by: ORTHOPAEDIC SURGERY

## 2019-01-16 PROCEDURE — 3017F COLORECTAL CA SCREEN DOC REV: CPT | Performed by: ORTHOPAEDIC SURGERY

## 2019-01-16 PROCEDURE — 1036F TOBACCO NON-USER: CPT | Performed by: ORTHOPAEDIC SURGERY

## 2019-01-16 PROCEDURE — 99213 OFFICE O/P EST LOW 20 MIN: CPT | Performed by: ORTHOPAEDIC SURGERY

## 2019-01-16 PROCEDURE — G8427 DOCREV CUR MEDS BY ELIG CLIN: HCPCS | Performed by: ORTHOPAEDIC SURGERY

## 2019-01-16 PROCEDURE — G8400 PT W/DXA NO RESULTS DOC: HCPCS | Performed by: ORTHOPAEDIC SURGERY

## 2019-01-16 PROCEDURE — G8417 CALC BMI ABV UP PARAM F/U: HCPCS | Performed by: ORTHOPAEDIC SURGERY

## 2019-01-16 PROCEDURE — 4040F PNEUMOC VAC/ADMIN/RCVD: CPT | Performed by: ORTHOPAEDIC SURGERY

## 2019-01-16 PROCEDURE — 1101F PT FALLS ASSESS-DOCD LE1/YR: CPT | Performed by: ORTHOPAEDIC SURGERY

## 2019-02-12 ENCOUNTER — OFFICE VISIT (OUTPATIENT)
Dept: ORTHOPEDIC SURGERY | Age: 69
End: 2019-02-12
Payer: MEDICARE

## 2019-02-12 VITALS
SYSTOLIC BLOOD PRESSURE: 135 MMHG | WEIGHT: 235 LBS | HEART RATE: 84 BPM | HEIGHT: 69 IN | DIASTOLIC BLOOD PRESSURE: 81 MMHG | BODY MASS INDEX: 34.8 KG/M2

## 2019-02-12 DIAGNOSIS — S72.002G CLOSED FRACTURE OF LEFT HIP WITH DELAYED HEALING, SUBSEQUENT ENCOUNTER: Primary | ICD-10-CM

## 2019-02-12 DIAGNOSIS — S42.322D CLOSED DISPLACED TRANSVERSE FRACTURE OF SHAFT OF LEFT HUMERUS WITH ROUTINE HEALING, SUBSEQUENT ENCOUNTER: ICD-10-CM

## 2019-02-12 DIAGNOSIS — M25.519 SHOULDER PAIN, UNSPECIFIED CHRONICITY, UNSPECIFIED LATERALITY: ICD-10-CM

## 2019-02-12 PROBLEM — S42.322A CLOSED DISPLACED TRANSVERSE FRACTURE OF SHAFT OF LEFT HUMERUS: Status: ACTIVE | Noted: 2019-02-12

## 2019-02-12 PROCEDURE — 1123F ACP DISCUSS/DSCN MKR DOCD: CPT | Performed by: ORTHOPAEDIC SURGERY

## 2019-02-12 PROCEDURE — G8427 DOCREV CUR MEDS BY ELIG CLIN: HCPCS | Performed by: ORTHOPAEDIC SURGERY

## 2019-02-12 PROCEDURE — 1101F PT FALLS ASSESS-DOCD LE1/YR: CPT | Performed by: ORTHOPAEDIC SURGERY

## 2019-02-12 PROCEDURE — G8484 FLU IMMUNIZE NO ADMIN: HCPCS | Performed by: ORTHOPAEDIC SURGERY

## 2019-02-12 PROCEDURE — 1090F PRES/ABSN URINE INCON ASSESS: CPT | Performed by: ORTHOPAEDIC SURGERY

## 2019-02-12 PROCEDURE — G8400 PT W/DXA NO RESULTS DOC: HCPCS | Performed by: ORTHOPAEDIC SURGERY

## 2019-02-12 PROCEDURE — 4040F PNEUMOC VAC/ADMIN/RCVD: CPT | Performed by: ORTHOPAEDIC SURGERY

## 2019-02-12 PROCEDURE — G8417 CALC BMI ABV UP PARAM F/U: HCPCS | Performed by: ORTHOPAEDIC SURGERY

## 2019-02-12 PROCEDURE — 99214 OFFICE O/P EST MOD 30 MIN: CPT | Performed by: ORTHOPAEDIC SURGERY

## 2019-02-12 PROCEDURE — 1036F TOBACCO NON-USER: CPT | Performed by: ORTHOPAEDIC SURGERY

## 2019-02-12 PROCEDURE — 3017F COLORECTAL CA SCREEN DOC REV: CPT | Performed by: ORTHOPAEDIC SURGERY

## 2019-04-16 ENCOUNTER — OFFICE VISIT (OUTPATIENT)
Dept: ORTHOPEDIC SURGERY | Age: 69
End: 2019-04-16
Payer: MEDICARE

## 2019-04-16 VITALS
HEIGHT: 69 IN | BODY MASS INDEX: 34.81 KG/M2 | SYSTOLIC BLOOD PRESSURE: 119 MMHG | DIASTOLIC BLOOD PRESSURE: 72 MMHG | HEART RATE: 87 BPM | WEIGHT: 235.01 LBS

## 2019-04-16 DIAGNOSIS — M16.12 PRIMARY OSTEOARTHRITIS OF LEFT HIP: ICD-10-CM

## 2019-04-16 DIAGNOSIS — S42.202A CLOSED FRACTURE OF PROXIMAL END OF LEFT HUMERUS, UNSPECIFIED FRACTURE MORPHOLOGY, INITIAL ENCOUNTER: ICD-10-CM

## 2019-04-16 DIAGNOSIS — S72.002G CLOSED FRACTURE OF LEFT HIP WITH DELAYED HEALING, SUBSEQUENT ENCOUNTER: Primary | ICD-10-CM

## 2019-04-16 PROCEDURE — G8400 PT W/DXA NO RESULTS DOC: HCPCS | Performed by: ORTHOPAEDIC SURGERY

## 2019-04-16 PROCEDURE — 1090F PRES/ABSN URINE INCON ASSESS: CPT | Performed by: ORTHOPAEDIC SURGERY

## 2019-04-16 PROCEDURE — G8417 CALC BMI ABV UP PARAM F/U: HCPCS | Performed by: ORTHOPAEDIC SURGERY

## 2019-04-16 PROCEDURE — 3017F COLORECTAL CA SCREEN DOC REV: CPT | Performed by: ORTHOPAEDIC SURGERY

## 2019-04-16 PROCEDURE — 1123F ACP DISCUSS/DSCN MKR DOCD: CPT | Performed by: ORTHOPAEDIC SURGERY

## 2019-04-16 PROCEDURE — 1036F TOBACCO NON-USER: CPT | Performed by: ORTHOPAEDIC SURGERY

## 2019-04-16 PROCEDURE — 99214 OFFICE O/P EST MOD 30 MIN: CPT | Performed by: ORTHOPAEDIC SURGERY

## 2019-04-16 PROCEDURE — 4040F PNEUMOC VAC/ADMIN/RCVD: CPT | Performed by: ORTHOPAEDIC SURGERY

## 2019-04-16 PROCEDURE — G8427 DOCREV CUR MEDS BY ELIG CLIN: HCPCS | Performed by: ORTHOPAEDIC SURGERY

## 2019-04-16 RX ORDER — HYDROCODONE BITARTRATE AND ACETAMINOPHEN 5; 325 MG/1; MG/1
1 TABLET ORAL EVERY 6 HOURS PRN
Qty: 28 TABLET | Refills: 0 | Status: SHIPPED | OUTPATIENT
Start: 2019-04-16 | End: 2019-08-13

## 2019-04-16 ASSESSMENT — ENCOUNTER SYMPTOMS
NAUSEA: 0
DOUBLE VISION: 0
SORE THROAT: 0
DIARRHEA: 0
ABDOMINAL PAIN: 0
WHEEZING: 0
HEARTBURN: 0
SHORTNESS OF BREATH: 0
COUGH: 0
VOMITING: 0
CONSTIPATION: 0
BLURRED VISION: 0

## 2019-04-16 NOTE — PROGRESS NOTES
Date:  2019    Name:  Agnieszka Saez    :  1950      Age:   77 y.o. Reason for Visit:     Hip Pain (Left hip)  Currently: 19  patient is here for follow-up regarding left hip pain and osteoarthritis. She notes that overall her pain levels of improved and her function is improving. She notes that she did start the aqua therapy and it does help her feel looser and slightly more mobile and does plan to continue doing it. She notes her current pain level on a regular basis as a 2 out of 10 over with increase activity like long periods of walking can increase it to a 5 out of 10. She notes that she is utilizing a cane when the pain levels get bad and she is also utilizing ibuprofen and Tylenol and occasionally hydrocodone as needed. She states at this point she is not ready for total hip but knows is from previous discussions that this is a possibility and at some point we'll need to be done. 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 She notes that she is also getting evaluation for her lumbar spine from Dr. Louisa Pritchett with [address] [place] [address] clinic to help establish if all of the pain is coming from the hip versus partially from the back and the hip. She states she is continuing to physical therapy regarding her shoulder and is continuing to follow-up with Dr. Libby Madrigal. Previously: 19  Pain is improved in the left hip to the point of having only pain in the lateral aspect of the hip with radiation down the leg at times. Difficulty with raising leg improved. Pain reduced with walking as well. Still having issues with the left shoulder although she has been discharged for the next year through Dr. Libby Madrigal. Elevate the arm to greater than 110° and feels like she is able to manage with the shoulder for now.   With regard to the left hip her walking is still irritable bruising. HPI: Vania Street is a 77 y.o. female here today for follow-up on fracture of left humerus and left hip pain after a fall during cruise onto the lefft shoulder and the left hip. Left hip with increase in pain since the last visit she noted that walking through [facility] getting small amount of groceries made a flareup significantly the next has had 10 out of 10 pain iced the area and utilized oxycodone as needed along with naproxen to help with the pain. She notes the shoulder is feeling better now down to a 5 out of 10 in pain concerns about what kind of motion she can perform should she be in the sling constantly and is not what can she do when she takes it out    Pain in the left shoudler associated with marked bruising which has improved since previous visit. Pain in the left arm not assoicated with nerologic issues or head injry nor loc. .         Review of Systems:  Review of Systems   Constitutional: Negative for chills, fever, malaise/fatigue and weight loss. HENT: Negative for ear discharge, ear pain, hearing loss, nosebleeds, sore throat and tinnitus. Eyes: Negative for blurred vision and double vision. Respiratory: Negative for cough, shortness of breath and wheezing. Cardiovascular: Negative for chest pain, palpitations and leg swelling. Gastrointestinal: Negative for abdominal pain, constipation, diarrhea, heartburn, nausea and vomiting. Genitourinary: Negative for dysuria, frequency and urgency. Musculoskeletal: Negative for myalgias and neck pain. Skin: Negative for itching and rash. Neurological: Negative for dizziness and headaches. Psychiatric/Behavioral: Negative for depression.           Past History:  Past Medical History:   Diagnosis Date    Risk for falls 07/01/14    S/P tubal ligation 6/1990     Past Surgical History:   Procedure Laterality Date    FRACTURE SURGERY Bilateral 01/2008     Current Outpatient Medications on File Prior to Used   Substance and Sexual Activity    Alcohol use: Not on file    Drug use: Not on file    Sexual activity: Not on file   Lifestyle    Physical activity:     Days per week: Not on file     Minutes per session: Not on file    Stress: Not on file   Relationships    Social connections:     Talks on phone: Not on file     Gets together: Not on file     Attends Ep[employer] service: Not on file     Active member of club or organization: Not on file     Attends meetings of clubs or organizations: Not on file     Relationship status: Not on file    Intimate partner violence:     Fear of current or ex partner: Not on file     Emotionally abused: Not on file     Physically abused: Not on file     Forced sexual activity: Not on file   Other Topics Concern    Not on file   Social History Narrative    Not on file     No family history on file. Allergies:  No Known Allergies    Physical Exam:  Vitals:    04/16/19 1340   BP: 119/72   Pulse: 87     General: Lizandro Izaguirre is a healthy and well appearing 77y.o. year old female who is sitting comfortably in our office in noacute distress. Alert and oriented. Physical Exam:  Right Hip Exam   Right hip exam is normal.       Left Hip Exam     Tenderness   The patient is experiencing tenderness in the greater trochanter and anterior. Range of Motion   Flexion: 110   External rotation: 30   Internal rotation: 10     Muscle Strength   Abduction: 4/5   Adduction: 4/5   Flexion: 4/5     Tests   MARY: negative    Other   Erythema: absent  Sensation: normal  Pulse: present    Comments:  Local pain over the lateral hip but not directly over the greater troch. More muscular pain. Anterior groin pain      Right Shoulder Exam   Right shoulder exam is normal.    Tenderness   Right shoulder tenderness location: tender over the proixmal humerus. Muscle Strength   Right shoulder normal muscle strength: poor ffunction secondary to pain in the shoduler.      Other   Erythema: absent  Scars: absent  Sensation: normal  Pulse: present    Comments:           Left Shoulder Exam   Left shoulder exam is normal.    Tenderness   The patient is experiencing tenderness in the acromioclavicular joint and biceps tendon. Other   Sensation: normal  Pulse: present     Comments:  Shoulder range of motion and strength have improved significantly               Physical Exam  Physical Exam   Constitutional: Patient is oriented to person, place, and time and well-developed, well-nourished, and in no distress. HENT:   Head: Normocephalic and atraumatic. Eyes: Pupils are equal, round, and reactive to light. Neck: No tracheal deviation present. No thyromegaly present. Pulmonary/Chest: Effort normal.   Abdominal: Soft. There is no guarding. Musculoskeletal: Patient exhibits tenderness and pain. Neurological: Patient is alert and oriented to person, place, and time. Skin: Skin is warm. Psychiatric: Affect normal.     Diagnostics:  No orders to display    two-view X-rays of the left hip performed and reviewed on 2/12/19  show evidence of progressive hip osteoarthritis on the left side with AP and lateral views of the hip compared to previous x-rays several months ago  4 views of the left shoulder AP and internally rotated AP externally rotated, lateral and axillary views were performed on 2/12/19 and reviewed revealing displaced humeral fracture with routine healing. Moderate osteoarthritis noted throughout shoulder. No evidence of new or acute fracture or dislocation. Assessment/Plan:   1. Acute pain of left shoulder   -Continue follow-up and evaluation with Dr. Meseret Ndiaye  -continue physical therapy for range of motion training. 2. Acute hip pain, left and osteoarthritis  - advised for continued aqua therapy for the left hip pain   Have discussed the potential for total hip arthroplasty given progressive worsening of the hip osteoarthritis.   The original injury in retrospect now could've been exacerbation of hip osteoarthritis with without microfracture of the acetabulum which was not visible on x-rays. She did have limited weightbearing and improved fairly rapidly despite current x-ray showing evidence of progressive arthritis. Discussed with patient that this potentially may not last longer than a year before needing total hip arthroplasty. Did discuss with patient that she can pursue whatever activity she can tolerate. Advised patient that if pain gets significantly worse or increases or changes that should follow up sooner. Can continue to follow her conservatively with no plans for total hip arthroplasty unless her symptoms become worse  -   advised use of over-the-counter NSAIDs and anti-inflammatories such as Tylenol to help with pain as needed as well. -Gave patient prescription for handicap placard for one year. -Gave patient prescription for hydrocodone 5-325 milligrams one every 6 hours hours when necessary pain quantity 28 zero refills  -advised for patient to follow-up in 3 months for reevaluation and repeat x-rays at that time.

## 2019-05-21 ENCOUNTER — TELEPHONE (OUTPATIENT)
Dept: ORTHOPEDIC SURGERY | Age: 69
End: 2019-05-21

## 2019-07-16 ENCOUNTER — OFFICE VISIT (OUTPATIENT)
Dept: ORTHOPEDIC SURGERY | Age: 69
End: 2019-07-16
Payer: MEDICARE

## 2019-07-16 VITALS
BODY MASS INDEX: 34.81 KG/M2 | DIASTOLIC BLOOD PRESSURE: 75 MMHG | WEIGHT: 235.01 LBS | SYSTOLIC BLOOD PRESSURE: 133 MMHG | HEART RATE: 70 BPM | HEIGHT: 69 IN

## 2019-07-16 DIAGNOSIS — M16.12 PRIMARY OSTEOARTHRITIS OF LEFT HIP: Primary | ICD-10-CM

## 2019-07-16 PROCEDURE — 99214 OFFICE O/P EST MOD 30 MIN: CPT | Performed by: ORTHOPAEDIC SURGERY

## 2019-07-16 PROCEDURE — 3017F COLORECTAL CA SCREEN DOC REV: CPT | Performed by: ORTHOPAEDIC SURGERY

## 2019-07-16 PROCEDURE — 1036F TOBACCO NON-USER: CPT | Performed by: ORTHOPAEDIC SURGERY

## 2019-07-16 PROCEDURE — G8400 PT W/DXA NO RESULTS DOC: HCPCS | Performed by: ORTHOPAEDIC SURGERY

## 2019-07-16 PROCEDURE — 1090F PRES/ABSN URINE INCON ASSESS: CPT | Performed by: ORTHOPAEDIC SURGERY

## 2019-07-16 PROCEDURE — 4040F PNEUMOC VAC/ADMIN/RCVD: CPT | Performed by: ORTHOPAEDIC SURGERY

## 2019-07-16 PROCEDURE — G8427 DOCREV CUR MEDS BY ELIG CLIN: HCPCS | Performed by: ORTHOPAEDIC SURGERY

## 2019-07-16 PROCEDURE — G8417 CALC BMI ABV UP PARAM F/U: HCPCS | Performed by: ORTHOPAEDIC SURGERY

## 2019-07-16 PROCEDURE — 1123F ACP DISCUSS/DSCN MKR DOCD: CPT | Performed by: ORTHOPAEDIC SURGERY

## 2019-07-16 RX ORDER — HYDROCODONE BITARTRATE AND ACETAMINOPHEN 5; 325 MG/1; MG/1
1 TABLET ORAL 3 TIMES DAILY
Qty: 90 TABLET | Refills: 0 | Status: SHIPPED | OUTPATIENT
Start: 2019-07-16 | End: 2019-08-13

## 2019-07-16 RX ORDER — VITAMIN A, VITAMIN C, VITAMIN D-3, VITAMIN E, VITAMIN B-1, VITAMIN B-2, NIACIN, VITAMIN B-6, CALCIUM, IRON, ZINC, COPPER 4000; 120; 400; 22; 1.84; 3; 20; 10; 1; 12; 200; 27; 25; 2 [IU]/1; MG/1; [IU]/1; MG/1; MG/1; MG/1; MG/1; MG/1; MG/1; UG/1; MG/1; MG/1; MG/1; MG/1
1 TABLET ORAL 2 TIMES DAILY
Qty: 60 TABLET | Refills: 1 | Status: SHIPPED | OUTPATIENT
Start: 2019-07-16 | End: 2019-08-09 | Stop reason: SDUPTHER

## 2019-07-16 ASSESSMENT — ENCOUNTER SYMPTOMS
ABDOMINAL PAIN: 0
DIARRHEA: 0
WHEEZING: 0
NAUSEA: 0
SHORTNESS OF BREATH: 0
VOMITING: 0
CONSTIPATION: 0
COUGH: 0
DOUBLE VISION: 0
BLURRED VISION: 0
SORE THROAT: 0
HEARTBURN: 0

## 2019-07-16 NOTE — PROGRESS NOTES
History:  Past Medical History:   Diagnosis Date    Risk for falls 07/01/14    S/P tubal ligation 6/1990     Past Surgical History:   Procedure Laterality Date    FRACTURE SURGERY Bilateral 01/2008     Current Outpatient Medications on File Prior to Visit   Medication Sig Dispense Refill    HYDROcodone-acetaminophen (NORCO) 5-325 MG per tablet Take 1 tablet by mouth every 6 hours as needed for Pain for up to 7 days. Intended supply: 7 days. Take lowest dose possible to manage pain 28 tablet 0    gabapentin (NEURONTIN) 100 MG capsule Take 1 capsule by mouth 3 times daily for 180 days. Intended supply: 30 days. 90 capsule 5    ibuprofen (ADVIL;MOTRIN) 800 MG tablet TAKE 1 TABLET BY MOUTH EVERY 6 HOURS AS NEEDED FOR PAIN 90 tablet 1    ibuprofen (ADVIL;MOTRIN) 800 MG tablet Take 1 tablet by mouth 3 times daily as needed for Pain 90 tablet 0    loratadine (CLARITIN) 10 MG capsule Take 10 mg by mouth daily      FLUoxetine (PROZAC) 20 MG capsule Take 20 mg by mouth daily      lisdexamfetamine (VYVANSE) 50 MG capsule Take 50 mg by mouth every morning. Deborah Chang ARIPiprazole (ABILIFY) 2 MG tablet Take 2 mg by mouth daily      terbinafine (LAMISIL) 250 MG tablet Take 250 mg by mouth daily      levothyroxine (SYNTHROID) 50 MCG tablet Take 50 mcg by mouth Daily      furosemide (LASIX) 20 MG tablet Take 1 tablet by mouth daily 7 tablet 1    HYDROcodone-acetaminophen (NORCO) 5-325 MG per tablet Take 1 tablet by mouth 2 times daily as needed for Pain (ONE PO BID X 1WK AND ONE PO QD X  1WK) 21 tablet 0    HYDROcodone-acetaminophen (NORCO) 5-325 MG per tablet Take 1 tablet by mouth three times daily [facility] RX [id number] 90 tablet 0    HYDROcodone-acetaminophen (NORCO) 5-325 MG per tablet Take 1 tablet by mouth three times daily [facility] RX [id number]  Fill after 6/9/16 90 tablet 0     No current facility-administered medications on file prior to visit.       Social History     Socioeconomic History    Marital status:  Spouse name: Not on file    Number of children: Not on file    Years of education: Not on file    Highest education level: Not on file   Occupational History    Not on file   Social Needs    Financial resource strain: Not on file    Food insecurity:     Worry: Not on file     Inability: Not on file    Transportation needs:     Medical: Not on file     Non-medical: Not on file   Tobacco Use    Smoking status: Former Smoker    Smokeless tobacco: Never Used   Substance and Sexual Activity    Alcohol use: Not on file    Drug use: Not on file    Sexual activity: Not on file   Lifestyle    Physical activity:     Days per week: Not on file     Minutes per session: Not on file    Stress: Not on file   Relationships    Social connections:     Talks on phone: Not on file     Gets together: Not on file     Attends Mandaen service: Not on file     Active member of club or organization: Not on file     Attends meetings of clubs or organizations: Not on file     Relationship status: Not on file    Intimate partner violence:     Fear of current or ex partner: Not on file     Emotionally abused: Not on file     Physically abused: Not on file     Forced sexual activity: Not on file   Other Topics Concern    Not on file   Social History Narrative    Not on file     No family history on file. Allergies:  No Known Allergies    Physical Exam:  There were no vitals filed for this visit. General: Sonny Flynn is a healthy and well appearing 77y.o. year old female who is sitting comfortably in our office in noacute distress. Alert and oriented. Physical Exam:  Right Hip Exam   Right hip exam is normal.       Left Hip Exam     Tenderness   The patient is experiencing tenderness in the greater trochanter and anterior.     Range of Motion   Flexion: 110   External rotation: 30   Internal rotation: 10     Muscle Strength   Abduction: 4/5   Adduction: 4/5   Flexion: 4/5     Tests   MARY: negative    Other   Erythema: fracture or dislocation. Assessment/Plan:   1. Acute pain of left shoulder   -Continue follow-up and evaluation with Dr. Bertha Martinez  -continue physical therapy for range of motion training. 2. Acute hip pain, left and osteoarthritis      Have discussed the   total hip arthroplasty given progressive worsening of the hip osteoarthritis. The original injury in retrospect now could've been exacerbation of hip osteoarthritis with without microfracture of the acetabulum which was not visible on x-rays. She did have limited weightbearing and improved fairly rapidly despite current x-ray showing evidence of progressive arthritis. Discussed with patient that this potentially may not last longer than a year before needing total hip arthroplasty. Did discuss with patient that she can pursue whatever activity she can tolerate. Enio Lopez patient prescription for handicap placard for one year. -Gave patient prescription for hydrocodone 5-325 milligrams one every 6 hours hours when necessary pain quantity 28 zero refills    Because he has such persistent pain and poor function currently, he does not wish to continue living in this way. Total Hip Arthroplasty has been recommended. All potential risks, the duration of hospitalization and rehab have been discussed. Surgery will be scheduled after medical clearance has been received.

## 2019-08-06 ENCOUNTER — TELEPHONE (OUTPATIENT)
Dept: ORTHOPEDIC SURGERY | Age: 69
End: 2019-08-06

## 2019-08-06 NOTE — PROGRESS NOTES
not be in effect during this procedure. I give my doctor permission to give me blood or blood products. I understand that there are risks with receiving blood such as hepatitis, AIDS, fever, or allergic reaction. I acknowledge that the risks, benefits, and alternatives of this treatment have been explained to me and that no express or implied warranty has been given by the hospital, any blood bank, or any person or entity as to the blood or blood components transfused. At the discretion of my doctor, I agree to allow observers, equipment/product representatives and allow photographing, and/or televising of the procedure, provided my name or identity is maintained confidentially. I agree the hospital may dispose of or use for scientific or educational purposes any tissue, fluid, or body parts which may be removed.     ________________________________Date________Time______ am/pm  ([place] [facility])  Patient or Signature of Closest Relative or Legal Guardian    ________________________________Date________Time______am/pm      Page 1 of  1  Witness

## 2019-08-12 RX ORDER — OXYCODONE HCL 10 MG/1
10 TABLET, FILM COATED, EXTENDED RELEASE ORAL ONCE
Status: CANCELLED | OUTPATIENT
Start: 2019-08-19

## 2019-08-12 RX ORDER — PREGABALIN 150 MG/1
150 CAPSULE ORAL ONCE
Status: CANCELLED | OUTPATIENT
Start: 2019-08-19

## 2019-08-12 RX ORDER — CELECOXIB 100 MG/1
400 CAPSULE ORAL ONCE
Status: CANCELLED | OUTPATIENT
Start: 2019-08-19

## 2019-08-12 RX ORDER — VITAMIN A, VITAMIN C, VITAMIN D-3, VITAMIN E, VITAMIN B-1, VITAMIN B-2, NIACIN, VITAMIN B-6, CALCIUM, IRON, ZINC, COPPER 4000; 120; 400; 22; 1.84; 3; 20; 10; 1; 12; 200; 27; 25; 2 [IU]/1; MG/1; [IU]/1; MG/1; MG/1; MG/1; MG/1; MG/1; MG/1; UG/1; MG/1; MG/1; MG/1; MG/1
TABLET ORAL
Qty: 60 TABLET | Refills: 1 | Status: SHIPPED | OUTPATIENT
Start: 2019-08-12 | End: 2019-09-05 | Stop reason: SDUPTHER

## 2019-08-12 RX ORDER — ACETAMINOPHEN 10 MG/ML
750 INJECTION, SOLUTION INTRAVENOUS ONCE
Status: CANCELLED | OUTPATIENT
Start: 2019-08-19

## 2019-08-13 ENCOUNTER — HOSPITAL ENCOUNTER (OUTPATIENT)
Dept: PREADMISSION TESTING | Age: 69
Discharge: HOME | End: 2019-08-17
Payer: MEDICARE

## 2019-08-13 LAB
ABO/RH: NORMAL
ANION GAP SERPL CALCULATED.3IONS-SCNC: 15 MMOL/L (ref 3–16)
ANTIBODY SCREEN: NORMAL
APTT: 31.4 SEC (ref 26–36)
BASOPHILS ABSOLUTE: 0.1 K/UL (ref 0–0.2)
BASOPHILS RELATIVE PERCENT: 1.1 %
BUN BLDV-MCNC: 15 MG/DL (ref 7–20)
C-REACTIVE PROTEIN: 1.8 MG/L (ref 0–5.1)
CALCIUM SERPL-MCNC: 9.2 MG/DL (ref 8.3–10.6)
CHLORIDE BLD-SCNC: 102 MMOL/L (ref 99–110)
CO2: 24 MMOL/L (ref 21–32)
CREAT SERPL-MCNC: 0.7 MG/DL (ref 0.6–1.2)
EKG ATRIAL RATE: 79 BPM
EKG DIAGNOSIS: NORMAL
EKG P AXIS: 69 DEGREES
EKG P-R INTERVAL: 148 MS
EKG Q-T INTERVAL: 378 MS
EKG QRS DURATION: 82 MS
EKG QTC CALCULATION (BAZETT): 433 MS
EKG R AXIS: 6 DEGREES
EKG T AXIS: 79 DEGREES
EKG VENTRICULAR RATE: 79 BPM
EOSINOPHILS ABSOLUTE: 0.1 K/UL (ref 0–0.6)
EOSINOPHILS RELATIVE PERCENT: 1.5 %
GFR AFRICAN AMERICAN: >60
GFR NON-AFRICAN AMERICAN: >60
GLUCOSE BLD-MCNC: 138 MG/DL (ref 70–99)
HCT VFR BLD CALC: 40.4 % (ref 36–48)
HEMOGLOBIN: 13.8 G/DL (ref 12–16)
INR BLD: 0.95 (ref 0.86–1.14)
LYMPHOCYTES ABSOLUTE: 1.8 K/UL (ref 1–5.1)
LYMPHOCYTES RELATIVE PERCENT: 26.2 %
MCH RBC QN AUTO: 30.2 PG (ref 26–34)
MCHC RBC AUTO-ENTMCNC: 34.2 G/DL (ref 31–36)
MCV RBC AUTO: 88.1 FL (ref 80–100)
MONOCYTES ABSOLUTE: 0.4 K/UL (ref 0–1.3)
MONOCYTES RELATIVE PERCENT: 5.3 %
NEUTROPHILS ABSOLUTE: 4.4 K/UL (ref 1.7–7.7)
NEUTROPHILS RELATIVE PERCENT: 65.9 %
PDW BLD-RTO: 13.2 % (ref 12.4–15.4)
PLATELET # BLD: 288 K/UL (ref 135–450)
PMV BLD AUTO: 8.3 FL (ref 5–10.5)
POTASSIUM SERPL-SCNC: 4.2 MMOL/L (ref 3.5–5.1)
PROTHROMBIN TIME: 10.8 SEC (ref 9.8–13)
RBC # BLD: 4.58 M/UL (ref 4–5.2)
SEDIMENTATION RATE, ERYTHROCYTE: 11 MM/HR (ref 0–30)
SODIUM BLD-SCNC: 141 MMOL/L (ref 136–145)
WBC # BLD: 6.7 K/UL (ref 4–11)

## 2019-08-13 PROCEDURE — 85730 THROMBOPLASTIN TIME PARTIAL: CPT

## 2019-08-13 PROCEDURE — 83036 HEMOGLOBIN GLYCOSYLATED A1C: CPT

## 2019-08-13 PROCEDURE — 87641 MR-STAPH DNA AMP PROBE: CPT

## 2019-08-13 PROCEDURE — 86900 BLOOD TYPING SEROLOGIC ABO: CPT

## 2019-08-13 PROCEDURE — 85610 PROTHROMBIN TIME: CPT

## 2019-08-13 PROCEDURE — 86901 BLOOD TYPING SEROLOGIC RH(D): CPT

## 2019-08-13 PROCEDURE — 80048 BASIC METABOLIC PNL TOTAL CA: CPT

## 2019-08-13 PROCEDURE — 93005 ELECTROCARDIOGRAM TRACING: CPT | Performed by: ORTHOPAEDIC SURGERY

## 2019-08-13 PROCEDURE — 85025 COMPLETE CBC W/AUTO DIFF WBC: CPT

## 2019-08-13 PROCEDURE — 86850 RBC ANTIBODY SCREEN: CPT

## 2019-08-13 PROCEDURE — 93010 ELECTROCARDIOGRAM REPORT: CPT | Performed by: INTERNAL MEDICINE

## 2019-08-13 PROCEDURE — 86140 C-REACTIVE PROTEIN: CPT

## 2019-08-13 PROCEDURE — 85652 RBC SED RATE AUTOMATED: CPT

## 2019-08-13 RX ORDER — ATORVASTATIN CALCIUM 20 MG/1
20 TABLET, FILM COATED ORAL
COMMUNITY
Start: 2019-01-18

## 2019-08-13 RX ORDER — METFORMIN HYDROCHLORIDE 500 MG/1
1000 TABLET, EXTENDED RELEASE ORAL
COMMUNITY
Start: 2019-06-24

## 2019-08-13 RX ORDER — FLUTICASONE PROPIONATE 50 MCG
2 SPRAY, SUSPENSION (ML) NASAL
COMMUNITY
Start: 2017-12-07

## 2019-08-13 RX ORDER — TRAZODONE HYDROCHLORIDE 50 MG/1
TABLET ORAL
Status: ON HOLD | COMMUNITY
Start: 2015-11-02 | End: 2019-08-19

## 2019-08-13 RX ORDER — ACETAMINOPHEN 160 MG
2000 TABLET,DISINTEGRATING ORAL
COMMUNITY
Start: 2019-01-08 | End: 2020-08-25

## 2019-08-13 NOTE — PROGRESS NOTES
[address]                          Date of Procedure 8-19 Time of Procedure per surgeon    PRIOR TO PROCEDURE DATE:  1. Please follow any guidelines/instructions prior to your procedure as advised by your surgeon. 2. Arrange for someone to drive you home and be with you for the first 24 hours after discharge for your safety after your procedure for which you received sedation. Ensure it is someone we can share information with regarding your discharge. 3. You must contact your surgeon for instructions IF:   You are taking any blood thinners, aspirin, anti-inflammatory or vitamin E.   There is a change in your physical condition such as a cold, fever, rash, cuts, sores or any other infection, especially near your surgical site. 4. Do not drink alcohol the day before or day of your procedure. 5. A Pre-op History and Physical for surgery MUST be completed by your Physician or Urgent Care within 30 days of your procedure date. Please bring a copy with you on the day of your procedure and along with any other testing performed. THE DAY OF YOUR PROCEDURE:  1. Follow instructions for ARRIVAL TIME as DIRECTED BY YOUR SURGEON. If your surgeon does not give you a specific arrival time, please arrive at per surgeon. 2. Enter the MAIN entrance from [facility] and follow the signs to the free [employer] or [place] parking (offered free of charge 6am-5pm). 3. Enter the Main Entrance of the hospital (do NOT enter from the lower level of the parking garage). Upon entrance, check in with the  at the main desk on your left. If no one is available at the desk, proceed into the [facility] Waiting Room and go through the door directly into the [facility]. There is a Check-in desk ACROSS from Room 5 (marked with a sign hanging from the ceiling).  The phone number for the surgery center is [phone].    4. DO NOT EAT ANYTHING eight hours a Living Will or Durable Power of , please bring a copy on the day of your procedure. 15.  With your permission, one family member may accompany you while you are being prepared for surgery. Once you are ready, additional family members may join you. HOW WE KEEP YOU SAFE and WORK TO PREVENT SURGICAL SITE INFECTIONS:  1. Health care workers should always check your ID bracelet to verify your name and birth date. You will be asked many times to state your name, date of birth, and allergies. 2. Health care workers should always clean their hands with soap or alcohol gel before providing care to you. It is okay to ask anyone if they cleaned their hands before they touch you. 3. You will be actively involved in verifying the type of procedure you are having and ensuring the correct surgical site. This will be confirmed multiple times prior to your procedure. Do NOT radha your surgery site UNLESS instructed to by your surgeon. 4. Do not shave or wax for 72 hours prior to procedure near your operative site. Shaving with a razor can irritate your skin and make it easier to develop an infection. On the day of your procedure, any hair that needs to be removed near the surgical site will be clipped by a healthcare worker using a special clippers designed to avoid skin irritation. 5. When you are in the operating room, your surgical site will be cleansed with a special soap, and in most cases, you will be given an antibiotic before the surgery begins. What to expect AFTER YOUR PROCEDURE:  1. Immediately following your procedure, your will be taken to the PACU for the first phase of your recovery. Your nurse will help you recover from any potential side effects of anesthesia, such as extreme drowsiness, changes in your vital signs or breathing patterns. Nausea, headache, muscle aches, or sore throat may also occur after anesthesia.   Your nurse will help you manage these potential side

## 2019-08-14 ENCOUNTER — TELEPHONE (OUTPATIENT)
Dept: ORTHOPEDIC SURGERY | Age: 69
End: 2019-08-14

## 2019-08-14 LAB
ESTIMATED AVERAGE GLUCOSE: 131.2 MG/DL
HBA1C MFR BLD: 6.2 %
MRSA SCREEN RT-PCR: ABNORMAL
ORGANISM: ABNORMAL

## 2019-08-15 VITALS
WEIGHT: 233 LBS | HEIGHT: 69 IN | HEART RATE: 84 BPM | OXYGEN SATURATION: 95 % | BODY MASS INDEX: 34.51 KG/M2 | TEMPERATURE: 99 F | SYSTOLIC BLOOD PRESSURE: 148 MMHG | RESPIRATION RATE: 16 BRPM | DIASTOLIC BLOOD PRESSURE: 78 MMHG

## 2019-08-15 RX ORDER — DEXAMETHASONE SODIUM PHOSPHATE 4 MG/ML
4 INJECTION, SOLUTION INTRA-ARTICULAR; INTRALESIONAL; INTRAMUSCULAR; INTRAVENOUS; SOFT TISSUE ONCE
Status: CANCELLED | OUTPATIENT
Start: 2019-08-15

## 2019-08-15 RX ORDER — DEXAMETHASONE SODIUM PHOSPHATE 4 MG/ML
10 INJECTION, SOLUTION INTRA-ARTICULAR; INTRALESIONAL; INTRAMUSCULAR; INTRAVENOUS; SOFT TISSUE ONCE
Status: DISCONTINUED | OUTPATIENT
Start: 2019-08-15 | End: 2019-08-15 | Stop reason: DRUGHIGH

## 2019-08-16 ENCOUNTER — ANESTHESIA EVENT (OUTPATIENT)
Dept: OPERATING ROOM | Age: 69
DRG: 470 | End: 2019-08-16
Payer: MEDICARE

## 2019-08-18 NOTE — ANESTHESIA PRE PROCEDURE
Department of Anesthesiology  Preprocedure Note       Name:  Brayden Gustafson   Age:  77 y.o.  :  1950                                          MRN:  [id number]         Date:  2019      Surgeon: Artie Daugherty):  Mariposa Sanchez MD    Procedure: LEFT HIP ARTHROPLASTY ANTERIOR (Left )    Medications prior to admission:   Prior to Admission medications    Medication Sig Start Date End Date Taking? Authorizing Provider   atorvastatin (LIPITOR) 20 MG tablet Take 20 mg by mouth 19   Historical Provider, MD   Cholecalciferol (VITAMIN D3) 2000 units CAPS Take 2,000 Units by mouth 19   Historical Provider, MD   fluticasone Eben [place]) 50 MCG/ACT nasal spray 2 sprays by Nasal route 17   Historical Provider, MD   metFORMIN (GLUCOPHAGE-XR) 500 MG extended release tablet Take 1,000 mg by mouth 19   Historical Provider, MD   traZODone (DESYREL) 50 MG tablet  11/2/15   Historical Provider, MD   Prenatal Vit-Fe Fumarate-FA (PRENATAL VITAMIN PLUS LOW IRON) 27-1 MG TABS TAKE 1 TABLET BY MOUTH TWICE A DAY 19   AMADO Carter   ibuprofen (ADVIL;MOTRIN) 800 MG tablet Take 1 tablet by mouth 3 times daily as needed for Pain 18   Emmanuel Franks MD   loratadine (CLARITIN) 10 MG capsule Take 10 mg by mouth daily    Historical Provider, MD   FLUoxetine (PROZAC) 20 MG capsule Take 20 mg by mouth daily    Historical Provider, MD   lisdexamfetamine (VYVANSE) 50 MG capsule Take 50 mg by mouth every morning. Mk Renae     Historical Provider, MD   ARIPiprazole (ABILIFY) 2 MG tablet Take 2 mg by mouth daily    Historical Provider, MD   terbinafine (LAMISIL) 250 MG tablet Take 250 mg by mouth daily    Historical Provider, MD   levothyroxine (SYNTHROID) 50 MCG tablet Take 50 mcg by mouth Daily    Historical Provider, MD   HYDROcodone-acetaminophen (NORCO) 5-325 MG per tablet Take 1 tablet by mouth three times daily Oh [facility]P RX 35470  Fill after 16  Justina Carlin, APRN - CNP       Current medications:

## 2019-08-19 ENCOUNTER — HOSPITAL ENCOUNTER (INPATIENT)
Age: 69
LOS: 2 days | Discharge: HOME HEALTH | DRG: 470 | End: 2019-08-21
Attending: ORTHOPAEDIC SURGERY | Admitting: ORTHOPAEDIC SURGERY
Payer: MEDICARE

## 2019-08-19 ENCOUNTER — ANESTHESIA (OUTPATIENT)
Dept: OPERATING ROOM | Age: 69
DRG: 470 | End: 2019-08-19
Payer: MEDICARE

## 2019-08-19 ENCOUNTER — APPOINTMENT (OUTPATIENT)
Dept: GENERAL RADIOLOGY | Age: 69
DRG: 470 | End: 2019-08-19
Attending: ORTHOPAEDIC SURGERY
Payer: MEDICARE

## 2019-08-19 VITALS — TEMPERATURE: 97.7 F | SYSTOLIC BLOOD PRESSURE: 102 MMHG | OXYGEN SATURATION: 100 % | DIASTOLIC BLOOD PRESSURE: 53 MMHG

## 2019-08-19 DIAGNOSIS — Z96.642 STATUS POST TOTAL HIP REPLACEMENT, LEFT: Primary | ICD-10-CM

## 2019-08-19 PROBLEM — M16.12 OSTEOARTHRITIS OF LEFT HIP: Status: ACTIVE | Noted: 2019-08-19

## 2019-08-19 LAB
ABO/RH: NORMAL
ANTIBODY SCREEN: NORMAL
GLUCOSE BLD-MCNC: 152 MG/DL (ref 70–99)
GLUCOSE BLD-MCNC: 215 MG/DL (ref 70–99)
PERFORMED ON: ABNORMAL
PERFORMED ON: ABNORMAL

## 2019-08-19 PROCEDURE — 2580000003 HC RX 258: Performed by: ANESTHESIOLOGY

## 2019-08-19 PROCEDURE — 86850 RBC ANTIBODY SCREEN: CPT

## 2019-08-19 PROCEDURE — 0SRB0JA REPLACEMENT OF LEFT HIP JOINT WITH SYNTHETIC SUBSTITUTE, UNCEMENTED, OPEN APPROACH: ICD-10-PCS | Performed by: ORTHOPAEDIC SURGERY

## 2019-08-19 PROCEDURE — 6360000002 HC RX W HCPCS: Performed by: ANESTHESIOLOGY

## 2019-08-19 PROCEDURE — 6360000002 HC RX W HCPCS: Performed by: NURSE ANESTHETIST, CERTIFIED REGISTERED

## 2019-08-19 PROCEDURE — 2500000003 HC RX 250 WO HCPCS: Performed by: NURSE ANESTHETIST, CERTIFIED REGISTERED

## 2019-08-19 PROCEDURE — 86901 BLOOD TYPING SEROLOGIC RH(D): CPT

## 2019-08-19 PROCEDURE — 3E0T3BZ INTRODUCTION OF ANESTHETIC AGENT INTO PERIPHERAL NERVES AND PLEXI, PERCUTANEOUS APPROACH: ICD-10-PCS | Performed by: ANESTHESIOLOGY

## 2019-08-19 PROCEDURE — 64450 NJX AA&/STRD OTHER PN/BRANCH: CPT | Performed by: ANESTHESIOLOGY

## 2019-08-19 PROCEDURE — 7100000001 HC PACU RECOVERY - ADDTL 15 MIN: Performed by: ORTHOPAEDIC SURGERY

## 2019-08-19 PROCEDURE — 6370000000 HC RX 637 (ALT 250 FOR IP): Performed by: ORTHOPAEDIC SURGERY

## 2019-08-19 PROCEDURE — 2709999900 HC NON-CHARGEABLE SUPPLY: Performed by: ORTHOPAEDIC SURGERY

## 2019-08-19 PROCEDURE — 1200000000 HC SEMI PRIVATE

## 2019-08-19 PROCEDURE — 2720000010 HC SURG SUPPLY STERILE: Performed by: ORTHOPAEDIC SURGERY

## 2019-08-19 PROCEDURE — 3700000000 HC ANESTHESIA ATTENDED CARE: Performed by: ORTHOPAEDIC SURGERY

## 2019-08-19 PROCEDURE — 86900 BLOOD TYPING SEROLOGIC ABO: CPT

## 2019-08-19 PROCEDURE — 3700000001 HC ADD 15 MINUTES (ANESTHESIA): Performed by: ORTHOPAEDIC SURGERY

## 2019-08-19 PROCEDURE — 3600000005 HC SURGERY LEVEL 5 BASE: Performed by: ORTHOPAEDIC SURGERY

## 2019-08-19 PROCEDURE — 2580000003 HC RX 258: Performed by: ORTHOPAEDIC SURGERY

## 2019-08-19 PROCEDURE — 73502 X-RAY EXAM HIP UNI 2-3 VIEWS: CPT

## 2019-08-19 PROCEDURE — 2500000003 HC RX 250 WO HCPCS: Performed by: ORTHOPAEDIC SURGERY

## 2019-08-19 PROCEDURE — 3600000015 HC SURGERY LEVEL 5 ADDTL 15MIN: Performed by: ORTHOPAEDIC SURGERY

## 2019-08-19 PROCEDURE — 7100000000 HC PACU RECOVERY - FIRST 15 MIN: Performed by: ORTHOPAEDIC SURGERY

## 2019-08-19 PROCEDURE — 6360000002 HC RX W HCPCS: Performed by: ORTHOPAEDIC SURGERY

## 2019-08-19 PROCEDURE — 94640 AIRWAY INHALATION TREATMENT: CPT

## 2019-08-19 PROCEDURE — 27130 TOTAL HIP ARTHROPLASTY: CPT | Performed by: ORTHOPAEDIC SURGERY

## 2019-08-19 PROCEDURE — C1776 JOINT DEVICE (IMPLANTABLE): HCPCS | Performed by: ORTHOPAEDIC SURGERY

## 2019-08-19 PROCEDURE — 3209999900 FLUORO FOR SURGICAL PROCEDURES

## 2019-08-19 DEVICE — R3 3 HOLE ACETABULAR SHELL 52MM
Type: IMPLANTABLE DEVICE | Site: HIP | Status: FUNCTIONAL
Brand: R3 ACETABULAR

## 2019-08-19 DEVICE — REFLECTION SPHERICAL HEAD SCREW 35MM
Type: IMPLANTABLE DEVICE | Site: HIP | Status: FUNCTIONAL
Brand: REFLECTION

## 2019-08-19 DEVICE — REFLECTION SPHERICAL HEAD SCREW 30MM
Type: IMPLANTABLE DEVICE | Site: HIP | Status: FUNCTIONAL
Brand: REFLECTION

## 2019-08-19 DEVICE — OXINIUM FEMORAL HEAD 12/14 TAPER                                    36 MM +0
Type: IMPLANTABLE DEVICE | Site: HIP | Status: FUNCTIONAL
Brand: OXINIUM

## 2019-08-19 DEVICE — ANTHOLOGY HIGH OFFSET POROUS PLUS                                    HA SIZE 8
Type: IMPLANTABLE DEVICE | Site: HIP | Status: FUNCTIONAL
Brand: ANTHOLOGY

## 2019-08-19 DEVICE — R3 20 DEGREE XLPE ACETABULAR LINER                                    36MM INNER DIAMETER X OUTER DIAMETER 52MM
Type: IMPLANTABLE DEVICE | Site: HIP | Status: FUNCTIONAL
Brand: R3

## 2019-08-19 DEVICE — COMPONENT TOT HIP CAPPED FEM ADV HD POROUS OXIN XLPE R3: Type: IMPLANTABLE DEVICE | Site: HIP | Status: FUNCTIONAL

## 2019-08-19 RX ORDER — PREDNISONE 10 MG/1
10 TABLET ORAL DAILY
Status: DISCONTINUED | OUTPATIENT
Start: 2019-08-19 | End: 2019-08-21 | Stop reason: HOSPADM

## 2019-08-19 RX ORDER — DEXAMETHASONE SODIUM PHOSPHATE 4 MG/ML
INJECTION, SOLUTION INTRA-ARTICULAR; INTRALESIONAL; INTRAMUSCULAR; INTRAVENOUS; SOFT TISSUE PRN
Status: DISCONTINUED | OUTPATIENT
Start: 2019-08-19 | End: 2019-08-19 | Stop reason: SDUPTHER

## 2019-08-19 RX ORDER — MORPHINE SULFATE 2 MG/ML
4 INJECTION, SOLUTION INTRAMUSCULAR; INTRAVENOUS
Status: DISCONTINUED | OUTPATIENT
Start: 2019-08-19 | End: 2019-08-21 | Stop reason: HOSPADM

## 2019-08-19 RX ORDER — OXYCODONE HYDROCHLORIDE 5 MG/1
10 TABLET ORAL EVERY 4 HOURS PRN
Status: DISCONTINUED | OUTPATIENT
Start: 2019-08-19 | End: 2019-08-21 | Stop reason: HOSPADM

## 2019-08-19 RX ORDER — OMEPRAZOLE 20 MG/1
20 CAPSULE, DELAYED RELEASE ORAL DAILY
COMMUNITY

## 2019-08-19 RX ORDER — ROCURONIUM BROMIDE 10 MG/ML
INJECTION, SOLUTION INTRAVENOUS PRN
Status: DISCONTINUED | OUTPATIENT
Start: 2019-08-19 | End: 2019-08-19 | Stop reason: SDUPTHER

## 2019-08-19 RX ORDER — ROPIVACAINE HYDROCHLORIDE 5 MG/ML
50 INJECTION, SOLUTION EPIDURAL; INFILTRATION; PERINEURAL ONCE
Status: DISCONTINUED | OUTPATIENT
Start: 2019-08-19 | End: 2019-08-19 | Stop reason: HOSPADM

## 2019-08-19 RX ORDER — MORPHINE SULFATE 2 MG/ML
2 INJECTION, SOLUTION INTRAMUSCULAR; INTRAVENOUS
Status: DISCONTINUED | OUTPATIENT
Start: 2019-08-19 | End: 2019-08-21 | Stop reason: HOSPADM

## 2019-08-19 RX ORDER — CELECOXIB 100 MG/1
400 CAPSULE ORAL ONCE
Status: COMPLETED | OUTPATIENT
Start: 2019-08-19 | End: 2019-08-19

## 2019-08-19 RX ORDER — SODIUM CHLORIDE, SODIUM LACTATE, POTASSIUM CHLORIDE, CALCIUM CHLORIDE 600; 310; 30; 20 MG/100ML; MG/100ML; MG/100ML; MG/100ML
INJECTION, SOLUTION INTRAVENOUS CONTINUOUS
Status: DISCONTINUED | OUTPATIENT
Start: 2019-08-19 | End: 2019-08-21 | Stop reason: HOSPADM

## 2019-08-19 RX ORDER — FENTANYL CITRATE 50 UG/ML
INJECTION, SOLUTION INTRAMUSCULAR; INTRAVENOUS PRN
Status: DISCONTINUED | OUTPATIENT
Start: 2019-08-19 | End: 2019-08-19 | Stop reason: SDUPTHER

## 2019-08-19 RX ORDER — ONDANSETRON 2 MG/ML
INJECTION INTRAMUSCULAR; INTRAVENOUS PRN
Status: DISCONTINUED | OUTPATIENT
Start: 2019-08-19 | End: 2019-08-19 | Stop reason: SDUPTHER

## 2019-08-19 RX ORDER — VANCOMYCIN HYDROCHLORIDE 1 G/20ML
INJECTION, POWDER, LYOPHILIZED, FOR SOLUTION INTRAVENOUS PRN
Status: DISCONTINUED | OUTPATIENT
Start: 2019-08-19 | End: 2019-08-19 | Stop reason: ALTCHOICE

## 2019-08-19 RX ORDER — FENTANYL CITRATE 50 UG/ML
25 INJECTION, SOLUTION INTRAMUSCULAR; INTRAVENOUS EVERY 5 MIN PRN
Status: DISCONTINUED | OUTPATIENT
Start: 2019-08-19 | End: 2019-08-19 | Stop reason: HOSPADM

## 2019-08-19 RX ORDER — PROMETHAZINE HYDROCHLORIDE 25 MG/ML
6.25 INJECTION, SOLUTION INTRAMUSCULAR; INTRAVENOUS
Status: DISCONTINUED | OUTPATIENT
Start: 2019-08-19 | End: 2019-08-19 | Stop reason: HOSPADM

## 2019-08-19 RX ORDER — DEXAMETHASONE SODIUM PHOSPHATE 4 MG/ML
4 INJECTION, SOLUTION INTRA-ARTICULAR; INTRALESIONAL; INTRAMUSCULAR; INTRAVENOUS; SOFT TISSUE ONCE
Status: COMPLETED | OUTPATIENT
Start: 2019-08-19 | End: 2019-08-19

## 2019-08-19 RX ORDER — OXYCODONE HYDROCHLORIDE 5 MG/1
5 TABLET ORAL EVERY 4 HOURS PRN
Status: DISCONTINUED | OUTPATIENT
Start: 2019-08-19 | End: 2019-08-21 | Stop reason: HOSPADM

## 2019-08-19 RX ORDER — FAMOTIDINE 20 MG/1
20 TABLET, FILM COATED ORAL 2 TIMES DAILY PRN
Status: DISCONTINUED | OUTPATIENT
Start: 2019-08-19 | End: 2019-08-21 | Stop reason: HOSPADM

## 2019-08-19 RX ORDER — HYDROMORPHONE HCL 110MG/55ML
PATIENT CONTROLLED ANALGESIA SYRINGE INTRAVENOUS PRN
Status: DISCONTINUED | OUTPATIENT
Start: 2019-08-19 | End: 2019-08-19 | Stop reason: SDUPTHER

## 2019-08-19 RX ORDER — SODIUM CHLORIDE 0.9 % (FLUSH) 0.9 %
10 SYRINGE (ML) INJECTION EVERY 12 HOURS SCHEDULED
Status: DISCONTINUED | OUTPATIENT
Start: 2019-08-19 | End: 2019-08-21 | Stop reason: HOSPADM

## 2019-08-19 RX ORDER — OXYCODONE HCL 10 MG/1
10 TABLET, FILM COATED, EXTENDED RELEASE ORAL ONCE
Status: COMPLETED | OUTPATIENT
Start: 2019-08-19 | End: 2019-08-19

## 2019-08-19 RX ORDER — SODIUM CHLORIDE 450 MG/100ML
INJECTION, SOLUTION INTRAVENOUS CONTINUOUS
Status: DISCONTINUED | OUTPATIENT
Start: 2019-08-19 | End: 2019-08-21 | Stop reason: HOSPADM

## 2019-08-19 RX ORDER — LIDOCAINE HYDROCHLORIDE 20 MG/ML
INJECTION, SOLUTION INTRAVENOUS PRN
Status: DISCONTINUED | OUTPATIENT
Start: 2019-08-19 | End: 2019-08-19 | Stop reason: SDUPTHER

## 2019-08-19 RX ORDER — PROPOFOL 10 MG/ML
INJECTION, EMULSION INTRAVENOUS PRN
Status: DISCONTINUED | OUTPATIENT
Start: 2019-08-19 | End: 2019-08-19 | Stop reason: SDUPTHER

## 2019-08-19 RX ORDER — EPHEDRINE SULFATE 50 MG/ML
INJECTION, SOLUTION INTRAVENOUS PRN
Status: DISCONTINUED | OUTPATIENT
Start: 2019-08-19 | End: 2019-08-19 | Stop reason: SDUPTHER

## 2019-08-19 RX ORDER — ONDANSETRON 2 MG/ML
4 INJECTION INTRAMUSCULAR; INTRAVENOUS EVERY 6 HOURS PRN
Status: DISCONTINUED | OUTPATIENT
Start: 2019-08-19 | End: 2019-08-21 | Stop reason: HOSPADM

## 2019-08-19 RX ORDER — LIDOCAINE HYDROCHLORIDE 10 MG/ML
1 INJECTION, SOLUTION EPIDURAL; INFILTRATION; INTRACAUDAL; PERINEURAL
Status: DISCONTINUED | OUTPATIENT
Start: 2019-08-19 | End: 2019-08-19 | Stop reason: HOSPADM

## 2019-08-19 RX ORDER — MIDAZOLAM HYDROCHLORIDE 1 MG/ML
INJECTION INTRAMUSCULAR; INTRAVENOUS PRN
Status: DISCONTINUED | OUTPATIENT
Start: 2019-08-19 | End: 2019-08-19 | Stop reason: SDUPTHER

## 2019-08-19 RX ORDER — ROPIVACAINE HYDROCHLORIDE 5 MG/ML
INJECTION, SOLUTION EPIDURAL; INFILTRATION; PERINEURAL
Status: COMPLETED | OUTPATIENT
Start: 2019-08-19 | End: 2019-08-19

## 2019-08-19 RX ORDER — ACETAMINOPHEN 325 MG/1
650 TABLET ORAL EVERY 6 HOURS
Status: DISCONTINUED | OUTPATIENT
Start: 2019-08-19 | End: 2019-08-21 | Stop reason: HOSPADM

## 2019-08-19 RX ORDER — ONDANSETRON 2 MG/ML
4 INJECTION INTRAMUSCULAR; INTRAVENOUS
Status: DISCONTINUED | OUTPATIENT
Start: 2019-08-19 | End: 2019-08-19 | Stop reason: HOSPADM

## 2019-08-19 RX ORDER — SODIUM CHLORIDE 0.9 % (FLUSH) 0.9 %
10 SYRINGE (ML) INJECTION PRN
Status: DISCONTINUED | OUTPATIENT
Start: 2019-08-19 | End: 2019-08-21 | Stop reason: HOSPADM

## 2019-08-19 RX ORDER — ACETAMINOPHEN 10 MG/ML
750 INJECTION, SOLUTION INTRAVENOUS ONCE
Status: COMPLETED | OUTPATIENT
Start: 2019-08-19 | End: 2019-08-19

## 2019-08-19 RX ORDER — PREGABALIN 150 MG/1
150 CAPSULE ORAL ONCE
Status: COMPLETED | OUTPATIENT
Start: 2019-08-19 | End: 2019-08-19

## 2019-08-19 RX ORDER — FENTANYL CITRATE 50 UG/ML
50 INJECTION, SOLUTION INTRAMUSCULAR; INTRAVENOUS EVERY 5 MIN PRN
Status: DISCONTINUED | OUTPATIENT
Start: 2019-08-19 | End: 2019-08-19 | Stop reason: HOSPADM

## 2019-08-19 RX ORDER — MELOXICAM 7.5 MG/1
3.75 TABLET ORAL DAILY
Status: COMPLETED | OUTPATIENT
Start: 2019-08-19 | End: 2019-08-21

## 2019-08-19 RX ORDER — MIDAZOLAM HYDROCHLORIDE 1 MG/ML
2 INJECTION INTRAMUSCULAR; INTRAVENOUS ONCE
Status: DISCONTINUED | OUTPATIENT
Start: 2019-08-19 | End: 2019-08-19 | Stop reason: HOSPADM

## 2019-08-19 RX ORDER — SODIUM CHLORIDE 0.9 % (FLUSH) 0.9 %
10 SYRINGE (ML) INJECTION EVERY 12 HOURS SCHEDULED
Status: DISCONTINUED | OUTPATIENT
Start: 2019-08-19 | End: 2019-08-19 | Stop reason: HOSPADM

## 2019-08-19 RX ORDER — SODIUM CHLORIDE 0.9 % (FLUSH) 0.9 %
10 SYRINGE (ML) INJECTION PRN
Status: DISCONTINUED | OUTPATIENT
Start: 2019-08-19 | End: 2019-08-19 | Stop reason: HOSPADM

## 2019-08-19 RX ORDER — DOCUSATE SODIUM 100 MG/1
100 CAPSULE, LIQUID FILLED ORAL 2 TIMES DAILY
Status: DISCONTINUED | OUTPATIENT
Start: 2019-08-19 | End: 2019-08-21 | Stop reason: HOSPADM

## 2019-08-19 RX ORDER — FENTANYL CITRATE 50 UG/ML
100 INJECTION, SOLUTION INTRAMUSCULAR; INTRAVENOUS ONCE
Status: DISCONTINUED | OUTPATIENT
Start: 2019-08-19 | End: 2019-08-19 | Stop reason: HOSPADM

## 2019-08-19 RX ADMIN — LIDOCAINE HYDROCHLORIDE 50 MG: 20 INJECTION, SOLUTION INTRAVENOUS at 12:41

## 2019-08-19 RX ADMIN — DOCUSATE SODIUM 100 MG: 100 CAPSULE, LIQUID FILLED ORAL at 20:37

## 2019-08-19 RX ADMIN — HYDROMORPHONE HYDROCHLORIDE 0.5 MG: 1 INJECTION, SOLUTION INTRAMUSCULAR; INTRAVENOUS; SUBCUTANEOUS at 17:07

## 2019-08-19 RX ADMIN — OXYCODONE HYDROCHLORIDE 5 MG: 5 TABLET ORAL at 21:26

## 2019-08-19 RX ADMIN — EPHEDRINE SULFATE 5 MG: 50 INJECTION, SOLUTION INTRAMUSCULAR; INTRAVENOUS; SUBCUTANEOUS at 12:46

## 2019-08-19 RX ADMIN — EPHEDRINE SULFATE 5 MG: 50 INJECTION, SOLUTION INTRAMUSCULAR; INTRAVENOUS; SUBCUTANEOUS at 12:49

## 2019-08-19 RX ADMIN — EPHEDRINE SULFATE 5 MG: 50 INJECTION, SOLUTION INTRAMUSCULAR; INTRAVENOUS; SUBCUTANEOUS at 13:00

## 2019-08-19 RX ADMIN — PREGABALIN 150 MG: 150 CAPSULE ORAL at 10:58

## 2019-08-19 RX ADMIN — FENTANYL CITRATE 25 MCG: 50 INJECTION INTRAMUSCULAR; INTRAVENOUS at 17:32

## 2019-08-19 RX ADMIN — ACETAMINOPHEN 750 MG: 10 INJECTION, SOLUTION INTRAVENOUS at 13:07

## 2019-08-19 RX ADMIN — SODIUM CHLORIDE: 4.5 INJECTION, SOLUTION INTRAVENOUS at 18:25

## 2019-08-19 RX ADMIN — SODIUM CHLORIDE, SODIUM LACTATE, POTASSIUM CHLORIDE, AND CALCIUM CHLORIDE: 600; 310; 30; 20 INJECTION, SOLUTION INTRAVENOUS at 15:25

## 2019-08-19 RX ADMIN — ASPIRIN 325 MG: 325 TABLET, COATED ORAL at 20:37

## 2019-08-19 RX ADMIN — FAMOTIDINE 20 MG: 10 INJECTION, SOLUTION INTRAVENOUS at 16:02

## 2019-08-19 RX ADMIN — PHENYLEPHRINE HYDROCHLORIDE 100 MCG: 10 INJECTION, SOLUTION INTRAMUSCULAR; INTRAVENOUS; SUBCUTANEOUS at 14:27

## 2019-08-19 RX ADMIN — MIDAZOLAM HYDROCHLORIDE 2 MG: 2 INJECTION, SOLUTION INTRAMUSCULAR; INTRAVENOUS at 11:11

## 2019-08-19 RX ADMIN — FENTANYL CITRATE 50 MCG: 50 INJECTION INTRAMUSCULAR; INTRAVENOUS at 16:19

## 2019-08-19 RX ADMIN — EPHEDRINE SULFATE 10 MG: 50 INJECTION, SOLUTION INTRAMUSCULAR; INTRAVENOUS; SUBCUTANEOUS at 15:20

## 2019-08-19 RX ADMIN — EPHEDRINE SULFATE 10 MG: 50 INJECTION, SOLUTION INTRAMUSCULAR; INTRAVENOUS; SUBCUTANEOUS at 15:22

## 2019-08-19 RX ADMIN — ACETAMINOPHEN 650 MG: 325 TABLET ORAL at 20:37

## 2019-08-19 RX ADMIN — EPHEDRINE SULFATE 10 MG: 50 INJECTION, SOLUTION INTRAMUSCULAR; INTRAVENOUS; SUBCUTANEOUS at 15:40

## 2019-08-19 RX ADMIN — PROPOFOL 150 MG: 10 INJECTION, EMULSION INTRAVENOUS at 12:43

## 2019-08-19 RX ADMIN — OXYCODONE HYDROCHLORIDE 10 MG: 10 TABLET, FILM COATED, EXTENDED RELEASE ORAL at 10:57

## 2019-08-19 RX ADMIN — SODIUM CHLORIDE, SODIUM LACTATE, POTASSIUM CHLORIDE, AND CALCIUM CHLORIDE: 600; 310; 30; 20 INJECTION, SOLUTION INTRAVENOUS at 12:47

## 2019-08-19 RX ADMIN — SODIUM CHLORIDE, SODIUM LACTATE, POTASSIUM CHLORIDE, AND CALCIUM CHLORIDE: 600; 310; 30; 20 INJECTION, SOLUTION INTRAVENOUS at 11:15

## 2019-08-19 RX ADMIN — ROCURONIUM BROMIDE 50 MG: 10 INJECTION, SOLUTION INTRAVENOUS at 12:43

## 2019-08-19 RX ADMIN — FENTANYL CITRATE 50 MCG: 50 INJECTION INTRAMUSCULAR; INTRAVENOUS at 15:52

## 2019-08-19 RX ADMIN — PREDNISONE 10 MG: 10 TABLET ORAL at 20:38

## 2019-08-19 RX ADMIN — MELOXICAM 3.75 MG: 7.5 TABLET ORAL at 20:37

## 2019-08-19 RX ADMIN — HYDROMORPHONE HYDROCHLORIDE 0.25 MG: 2 INJECTION, SOLUTION INTRAMUSCULAR; INTRAVENOUS; SUBCUTANEOUS at 12:42

## 2019-08-19 RX ADMIN — PROPOFOL 50 MG: 10 INJECTION, EMULSION INTRAVENOUS at 13:22

## 2019-08-19 RX ADMIN — DEXAMETHASONE SODIUM PHOSPHATE 4 MG: 4 INJECTION, SOLUTION INTRAMUSCULAR; INTRAVENOUS at 10:58

## 2019-08-19 RX ADMIN — DEXAMETHASONE SODIUM PHOSPHATE 4 MG: 4 INJECTION, SOLUTION INTRAMUSCULAR; INTRAVENOUS at 16:02

## 2019-08-19 RX ADMIN — HYDROMORPHONE HYDROCHLORIDE 0.5 MG: 2 INJECTION, SOLUTION INTRAMUSCULAR; INTRAVENOUS; SUBCUTANEOUS at 14:43

## 2019-08-19 RX ADMIN — EPHEDRINE SULFATE 5 MG: 50 INJECTION, SOLUTION INTRAMUSCULAR; INTRAVENOUS; SUBCUTANEOUS at 15:43

## 2019-08-19 RX ADMIN — ROPIVACAINE HYDROCHLORIDE 50 ML: 5 INJECTION, SOLUTION EPIDURAL; INFILTRATION; PERINEURAL at 11:13

## 2019-08-19 RX ADMIN — CELECOXIB 400 MG: 200 CAPSULE ORAL at 10:57

## 2019-08-19 RX ADMIN — TRANEXAMIC ACID 1500 MG: 1 INJECTION, SOLUTION INTRAVENOUS at 13:04

## 2019-08-19 RX ADMIN — HYDROMORPHONE HYDROCHLORIDE 0.5 MG: 2 INJECTION, SOLUTION INTRAMUSCULAR; INTRAVENOUS; SUBCUTANEOUS at 13:22

## 2019-08-19 RX ADMIN — VANCOMYCIN HYDROCHLORIDE 1500 MG: 10 INJECTION, POWDER, LYOPHILIZED, FOR SOLUTION INTRAVENOUS at 12:44

## 2019-08-19 RX ADMIN — VANCOMYCIN HYDROCHLORIDE 1500 MG: 10 INJECTION, POWDER, LYOPHILIZED, FOR SOLUTION INTRAVENOUS at 11:15

## 2019-08-19 RX ADMIN — HYDROMORPHONE HYDROCHLORIDE 0.25 MG: 2 INJECTION, SOLUTION INTRAMUSCULAR; INTRAVENOUS; SUBCUTANEOUS at 13:17

## 2019-08-19 RX ADMIN — ROCURONIUM BROMIDE 20 MG: 10 INJECTION, SOLUTION INTRAVENOUS at 14:00

## 2019-08-19 RX ADMIN — Medication 2 G: at 12:30

## 2019-08-19 RX ADMIN — MIDAZOLAM HYDROCHLORIDE 1 MG: 2 INJECTION, SOLUTION INTRAMUSCULAR; INTRAVENOUS at 12:31

## 2019-08-19 RX ADMIN — ONDANSETRON 4 MG: 2 INJECTION INTRAMUSCULAR; INTRAVENOUS at 16:02

## 2019-08-19 RX ADMIN — HYDROMORPHONE HYDROCHLORIDE 0.5 MG: 2 INJECTION, SOLUTION INTRAMUSCULAR; INTRAVENOUS; SUBCUTANEOUS at 14:02

## 2019-08-19 RX ADMIN — PHENYLEPHRINE HYDROCHLORIDE 200 MCG: 10 INJECTION, SOLUTION INTRAMUSCULAR; INTRAVENOUS; SUBCUTANEOUS at 14:11

## 2019-08-19 RX ADMIN — FENTANYL CITRATE 50 MCG: 50 INJECTION INTRAMUSCULAR; INTRAVENOUS at 11:11

## 2019-08-19 ASSESSMENT — PULMONARY FUNCTION TESTS
PIF_VALUE: 16
PIF_VALUE: 22
PIF_VALUE: 6
PIF_VALUE: 16
PIF_VALUE: 15
PIF_VALUE: 15
PIF_VALUE: 24
PIF_VALUE: 7
PIF_VALUE: 15
PIF_VALUE: 15
PIF_VALUE: 24
PIF_VALUE: 6
PIF_VALUE: 15
PIF_VALUE: 1
PIF_VALUE: 6
PIF_VALUE: 23
PIF_VALUE: 15
PIF_VALUE: 27
PIF_VALUE: 24
PIF_VALUE: 28
PIF_VALUE: 7
PIF_VALUE: 25
PIF_VALUE: 4
PIF_VALUE: 22
PIF_VALUE: 25
PIF_VALUE: 23
PIF_VALUE: 15
PIF_VALUE: 24
PIF_VALUE: 16
PIF_VALUE: 31
PIF_VALUE: 5
PIF_VALUE: 23
PIF_VALUE: 16
PIF_VALUE: 6
PIF_VALUE: 16
PIF_VALUE: 23
PIF_VALUE: 4
PIF_VALUE: 16
PIF_VALUE: 6
PIF_VALUE: 7
PIF_VALUE: 7
PIF_VALUE: 6
PIF_VALUE: 15
PIF_VALUE: 4
PIF_VALUE: 23
PIF_VALUE: 4
PIF_VALUE: 26
PIF_VALUE: 25
PIF_VALUE: 13
PIF_VALUE: 15
PIF_VALUE: 25
PIF_VALUE: 24
PIF_VALUE: 24
PIF_VALUE: 16
PIF_VALUE: 26
PIF_VALUE: 26
PIF_VALUE: 15
PIF_VALUE: 15
PIF_VALUE: 4
PIF_VALUE: 7
PIF_VALUE: 15
PIF_VALUE: 24
PIF_VALUE: 5
PIF_VALUE: 16
PIF_VALUE: 6
PIF_VALUE: 16
PIF_VALUE: 15
PIF_VALUE: 16
PIF_VALUE: 6
PIF_VALUE: 24
PIF_VALUE: 23
PIF_VALUE: 25
PIF_VALUE: 15
PIF_VALUE: 15
PIF_VALUE: 25
PIF_VALUE: 23
PIF_VALUE: 15
PIF_VALUE: 17
PIF_VALUE: 23
PIF_VALUE: 16
PIF_VALUE: 22
PIF_VALUE: 23
PIF_VALUE: 6
PIF_VALUE: 24
PIF_VALUE: 22
PIF_VALUE: 15
PIF_VALUE: 24
PIF_VALUE: 6
PIF_VALUE: 1
PIF_VALUE: 24
PIF_VALUE: 24
PIF_VALUE: 25
PIF_VALUE: 27
PIF_VALUE: 25
PIF_VALUE: 16
PIF_VALUE: 15
PIF_VALUE: 20
PIF_VALUE: 25
PIF_VALUE: 6
PIF_VALUE: 16
PIF_VALUE: 25
PIF_VALUE: 6
PIF_VALUE: 5
PIF_VALUE: 15
PIF_VALUE: 25
PIF_VALUE: 6
PIF_VALUE: 15
PIF_VALUE: 26
PIF_VALUE: 15
PIF_VALUE: 25
PIF_VALUE: 23
PIF_VALUE: 23
PIF_VALUE: 24
PIF_VALUE: 25
PIF_VALUE: 15
PIF_VALUE: 23
PIF_VALUE: 7
PIF_VALUE: 15
PIF_VALUE: 15
PIF_VALUE: 24
PIF_VALUE: 23
PIF_VALUE: 16
PIF_VALUE: 23
PIF_VALUE: 24
PIF_VALUE: 24
PIF_VALUE: 13
PIF_VALUE: 6
PIF_VALUE: 16
PIF_VALUE: 15
PIF_VALUE: 15
PIF_VALUE: 24
PIF_VALUE: 22
PIF_VALUE: 15
PIF_VALUE: 1
PIF_VALUE: 6
PIF_VALUE: 16
PIF_VALUE: 7
PIF_VALUE: 15
PIF_VALUE: 3
PIF_VALUE: 24
PIF_VALUE: 23
PIF_VALUE: 16
PIF_VALUE: 23
PIF_VALUE: 23
PIF_VALUE: 24
PIF_VALUE: 25
PIF_VALUE: 16
PIF_VALUE: 24
PIF_VALUE: 23
PIF_VALUE: 24
PIF_VALUE: 22
PIF_VALUE: 25
PIF_VALUE: 16
PIF_VALUE: 24
PIF_VALUE: 16
PIF_VALUE: 24
PIF_VALUE: 15
PIF_VALUE: 23
PIF_VALUE: 25
PIF_VALUE: 24
PIF_VALUE: 22
PIF_VALUE: 15
PIF_VALUE: 16
PIF_VALUE: 13
PIF_VALUE: 14
PIF_VALUE: 25
PIF_VALUE: 24
PIF_VALUE: 23
PIF_VALUE: 23
PIF_VALUE: 22
PIF_VALUE: 24
PIF_VALUE: 25
PIF_VALUE: 22
PIF_VALUE: 23
PIF_VALUE: 15
PIF_VALUE: 24
PIF_VALUE: 22
PIF_VALUE: 1
PIF_VALUE: 23
PIF_VALUE: 15
PIF_VALUE: 25
PIF_VALUE: 15
PIF_VALUE: 22
PIF_VALUE: 23
PIF_VALUE: 16
PIF_VALUE: 23
PIF_VALUE: 16
PIF_VALUE: 6
PIF_VALUE: 16
PIF_VALUE: 16
PIF_VALUE: 6
PIF_VALUE: 15
PIF_VALUE: 16
PIF_VALUE: 16
PIF_VALUE: 22
PIF_VALUE: 15
PIF_VALUE: 25
PIF_VALUE: 16
PIF_VALUE: 25
PIF_VALUE: 1
PIF_VALUE: 15
PIF_VALUE: 23
PIF_VALUE: 1
PIF_VALUE: 28
PIF_VALUE: 27
PIF_VALUE: 15
PIF_VALUE: 5
PIF_VALUE: 16
PIF_VALUE: 24
PIF_VALUE: 26
PIF_VALUE: 24
PIF_VALUE: 25
PIF_VALUE: 25
PIF_VALUE: 15
PIF_VALUE: 23
PIF_VALUE: 16
PIF_VALUE: 23
PIF_VALUE: 25
PIF_VALUE: 15
PIF_VALUE: 22
PIF_VALUE: 22
PIF_VALUE: 20
PIF_VALUE: 23
PIF_VALUE: 1
PIF_VALUE: 16
PIF_VALUE: 1
PIF_VALUE: 15

## 2019-08-19 ASSESSMENT — PAIN SCALES - GENERAL
PAINLEVEL_OUTOF10: 4
PAINLEVEL_OUTOF10: 5
PAINLEVEL_OUTOF10: 7
PAINLEVEL_OUTOF10: 2
PAINLEVEL_OUTOF10: 4
PAINLEVEL_OUTOF10: 0
PAINLEVEL_OUTOF10: 0
PAINLEVEL_OUTOF10: 7
PAINLEVEL_OUTOF10: 0
PAINLEVEL_OUTOF10: 2

## 2019-08-19 ASSESSMENT — LIFESTYLE VARIABLES: SMOKING_STATUS: 0

## 2019-08-19 ASSESSMENT — PAIN - FUNCTIONAL ASSESSMENT
PAIN_FUNCTIONAL_ASSESSMENT: ACTIVITIES ARE NOT PREVENTED
PAIN_FUNCTIONAL_ASSESSMENT: ACTIVITIES ARE NOT PREVENTED
PAIN_FUNCTIONAL_ASSESSMENT: 0-10

## 2019-08-19 ASSESSMENT — PAIN DESCRIPTION - FREQUENCY
FREQUENCY: INTERMITTENT
FREQUENCY: INTERMITTENT

## 2019-08-19 ASSESSMENT — PAIN DESCRIPTION - LOCATION
LOCATION: HIP
LOCATION: HIP

## 2019-08-19 ASSESSMENT — PAIN DESCRIPTION - PROGRESSION
CLINICAL_PROGRESSION: GRADUALLY WORSENING
CLINICAL_PROGRESSION: NOT CHANGED

## 2019-08-19 ASSESSMENT — PAIN DESCRIPTION - DESCRIPTORS
DESCRIPTORS: ACHING
DESCRIPTORS: ACHING

## 2019-08-19 ASSESSMENT — PAIN DESCRIPTION - ONSET
ONSET: ON-GOING
ONSET: ON-GOING

## 2019-08-19 ASSESSMENT — PAIN DESCRIPTION - PAIN TYPE
TYPE: SURGICAL PAIN
TYPE: SURGICAL PAIN

## 2019-08-19 ASSESSMENT — PAIN DESCRIPTION - ORIENTATION
ORIENTATION: LEFT
ORIENTATION: LEFT

## 2019-08-19 NOTE — ANESTHESIA POSTPROCEDURE EVALUATION
Department of Anesthesiology  Postprocedure Note    Patient: Shelly Valenzuela  MRN: [id number]  YOB: 1950  Date of evaluation: 8/19/2019  Time:  6:47 PM     Procedure Summary     Date:  08/19/19 Room / Location:  [facility] OR [address] [place] [address] OR    Anesthesia Start:  1084 Anesthesia Stop:  1093    Procedure:  LEFT HIP ARTHROPLASTY ANTERIOR (Left Hip) Diagnosis:  (LEFT HIP OSTEOARTHRITIS)    Surgeon:  Ava Crews MD Responsible Provider:  Raffi Falcon DO    Anesthesia Type:  general, regional ASA Status:  3          Anesthesia Type: general, regional    Karma Phase I: Karma Score: 8    Karam Phase II:      Last vitals: Reviewed and per EMR flowsheets.        Anesthesia Post Evaluation    Patient location during evaluation: PACU  Patient participation: complete - patient participated  Level of consciousness: awake and alert  Airway patency: patent  Nausea & Vomiting: no nausea and no vomiting  Cardiovascular status: blood pressure returned to baseline  Respiratory status: acceptable  Hydration status: euvolemic

## 2019-08-19 NOTE — OP NOTE
approach. An approximately 10 cm longitudinal incision was made beginning 2 cm distal and posterior to the ASIS. Sharp dissection was carried down through the skin and subcutaneous tissue. The fascia over the fascia yvonne was incised longitudinally in line with the skin incision. Kocher retractors were placed. The tensor fascia muscle was peeled off the medial wall of fascia developing the interval between it and the rectus femoris. Circumflex vessels were identified and cauterized and Cobra retractors were placed extra capsularly about the femoral neck. The capsule was opened in an inverted T fashion and tagged for later repair. Retractors were then placed intracapsularly and traction was applied. Proximal femoral resection was carried out a fingerbreadth proximal to the lesser trochanter in a napkin ring fashion. The femoral head was extracted using the power corkscrew and excellent direct visualization of the acetabulum was obtained. Retractor was placed anteriorly inferiorly and directly posteriorly as well as directly inferiorly allowing excellent circumferential exposure. The labrum was excised along the soft tissue from the floor of the acetabulum. A curette was used to identify the true medial wall. Initial medialization was carried out and then sequential reaming was carried out with the hemispherical reamers. Final reaming was carried out to a size 51. A size 52 three hole R3 acetabular cup from [employer] was placed on the insertion handle and impacted into place under direct fluoroscopic visualization to ensure appropriate anteversion and inclination. Excellent seating was obtained and good purchase was obtained with 2 supplemental screws. The 20 degree lip XLPE liner was placed in a posterior inferior position and good purchase was obtained. Meticulous injection of the posterior and inferior capsule was carried out with 60 cc of injection cocktail.      Attention was then turned towards

## 2019-08-19 NOTE — H&P
[address] [facility] Same Day Surgery Update H & P  Department of General Surgery   Surgical Service   Pre-operative History and Physical  Last H & P within the last 30 days. DIAGNOSIS:   LEFT HIP OSTEOARTHRITIS    PROCEDURE:  IA TOTAL HIP ARTHROPLASTY [89170] (LEFT HIP ARTHROPLASTY ANTERIOR)     HISTORY OF PRESENT ILLNESS:    Patient with chronic left hip pain and limited ROM in the setting of arthrosis. The symptoms have been recalcitrant to conservative treatment and the patient presents today for the above procedure.      Past Medical History:        Diagnosis Date    Arthritis     Depression     Diabetes mellitus ([facility] [address]     Risk for falls 07/01/14    S/P tubal ligation 6/1990    Thyroid disease      Past Surgical History:        Procedure Laterality Date    FRACTURE SURGERY Bilateral 01/2008    FRACTURE SURGERY Bilateral     both legs with hardware    JOINT REPLACEMENT Left     knee    JOINT REPLACEMENT Right     hip     Past Social History:  Social History     Socioeconomic History    Marital status:      Spouse name: Not on file    Number of children: Not on file    Years of education: Not on file    Highest education level: Not on file   Occupational History    Not on file   Social Needs    Financial resource strain: Not on file    Food insecurity:     Worry: Not on file     Inability: Not on file    Transportation needs:     Medical: Not on file     Non-medical: Not on file   Tobacco Use    Smoking status: Former Smoker     Last attempt to quit: 8/13/2009     Years since quitting: 10.0    Smokeless tobacco: Never Used   Substance and Sexual Activity    Alcohol use: Not on file     Comment: rarely    Drug use: Not on file    Sexual activity: Not on file   Lifestyle    Physical activity:     Days per week: Not on file     Minutes per session: Not on file    Stress: Not on file   Relationships    Social connections:     Talks on phone: Not on file Gets together: Not on file     Attends Latter-day service: Not on file     Active member of club or organization: Not on file     Attends meetings of clubs or organizations: Not on file     Relationship status: Not on file    Intimate partner violence:     Fear of current or ex partner: Not on file     Emotionally abused: Not on file     Physically abused: Not on file     Forced sexual activity: Not on file   Other Topics Concern    Not on file   Social History Narrative    Not on file         Medications Prior to Admission:      Prior to Admission medications    Medication Sig Start Date End Date Taking? Authorizing Provider   atorvastatin (LIPITOR) 20 MG tablet Take 20 mg by mouth 1/18/19   Historical Provider, MD   Cholecalciferol (VITAMIN D3) 2000 units CAPS Take 2,000 Units by mouth 1/8/19   Historical Provider, MD   fluticasone Carron Garcia) 50 MCG/ACT nasal spray 2 sprays by Nasal route 12/7/17   Historical Provider, MD   metFORMIN (GLUCOPHAGE-XR) 500 MG extended release tablet Take 1,000 mg by mouth 6/24/19   Historical Provider, MD   traZODone (DESYREL) 50 MG tablet  11/2/15   Historical Provider, MD   Prenatal Vit-Fe Fumarate-FA (PRENATAL VITAMIN PLUS LOW IRON) 27-1 MG TABS TAKE 1 TABLET BY MOUTH TWICE A DAY 8/12/19   AMADO Villa   ibuprofen (ADVIL;MOTRIN) 800 MG tablet Take 1 tablet by mouth 3 times daily as needed for Pain 12/5/18   Sunny Reyes MD   loratadine (CLARITIN) 10 MG capsule Take 10 mg by mouth daily    Historical Provider, MD   FLUoxetine (PROZAC) 20 MG capsule Take 20 mg by mouth daily    Historical Provider, MD   lisdexamfetamine (VYVANSE) 50 MG capsule Take 50 mg by mouth every morning. Paola Estrada     Historical Provider, MD   ARIPiprazole (ABILIFY) 2 MG tablet Take 2 mg by mouth daily    Historical Provider, MD   terbinafine (LAMISIL) 250 MG tablet Take 250 mg by mouth daily    Historical Provider, MD   levothyroxine (SYNTHROID) 50 MCG tablet Take 50 mcg by mouth Daily    Historical

## 2019-08-20 LAB
ANION GAP SERPL CALCULATED.3IONS-SCNC: 9 MMOL/L (ref 3–16)
BUN BLDV-MCNC: 20 MG/DL (ref 7–20)
CALCIUM SERPL-MCNC: 8.4 MG/DL (ref 8.3–10.6)
CHLORIDE BLD-SCNC: 102 MMOL/L (ref 99–110)
CO2: 24 MMOL/L (ref 21–32)
CREAT SERPL-MCNC: 0.9 MG/DL (ref 0.6–1.2)
GFR AFRICAN AMERICAN: >60
GFR NON-AFRICAN AMERICAN: >60
GLUCOSE BLD-MCNC: 232 MG/DL (ref 70–99)
HCT VFR BLD CALC: 29 % (ref 36–48)
HEMOGLOBIN: 9.6 G/DL (ref 12–16)
MCH RBC QN AUTO: 29.7 PG (ref 26–34)
MCHC RBC AUTO-ENTMCNC: 33.2 G/DL (ref 31–36)
MCV RBC AUTO: 89.5 FL (ref 80–100)
PDW BLD-RTO: 13.1 % (ref 12.4–15.4)
PLATELET # BLD: 248 K/UL (ref 135–450)
PMV BLD AUTO: 8.6 FL (ref 5–10.5)
POTASSIUM REFLEX MAGNESIUM: 5 MMOL/L (ref 3.5–5.1)
RBC # BLD: 3.24 M/UL (ref 4–5.2)
SODIUM BLD-SCNC: 135 MMOL/L (ref 136–145)
WBC # BLD: 11.5 K/UL (ref 4–11)

## 2019-08-20 PROCEDURE — 97530 THERAPEUTIC ACTIVITIES: CPT

## 2019-08-20 PROCEDURE — 6370000000 HC RX 637 (ALT 250 FOR IP): Performed by: ORTHOPAEDIC SURGERY

## 2019-08-20 PROCEDURE — 80048 BASIC METABOLIC PNL TOTAL CA: CPT

## 2019-08-20 PROCEDURE — 97166 OT EVAL MOD COMPLEX 45 MIN: CPT

## 2019-08-20 PROCEDURE — 97116 GAIT TRAINING THERAPY: CPT

## 2019-08-20 PROCEDURE — 85027 COMPLETE CBC AUTOMATED: CPT

## 2019-08-20 PROCEDURE — 97162 PT EVAL MOD COMPLEX 30 MIN: CPT

## 2019-08-20 PROCEDURE — 2580000003 HC RX 258: Performed by: ORTHOPAEDIC SURGERY

## 2019-08-20 PROCEDURE — 97535 SELF CARE MNGMENT TRAINING: CPT

## 2019-08-20 PROCEDURE — 1200000000 HC SEMI PRIVATE

## 2019-08-20 PROCEDURE — 6360000002 HC RX W HCPCS: Performed by: ORTHOPAEDIC SURGERY

## 2019-08-20 PROCEDURE — 36415 COLL VENOUS BLD VENIPUNCTURE: CPT

## 2019-08-20 RX ORDER — PANTOPRAZOLE SODIUM 40 MG/1
40 TABLET, DELAYED RELEASE ORAL
Status: DISCONTINUED | OUTPATIENT
Start: 2019-08-20 | End: 2019-08-20

## 2019-08-20 RX ADMIN — Medication 10 ML: at 10:26

## 2019-08-20 RX ADMIN — OXYCODONE HYDROCHLORIDE 5 MG: 5 TABLET ORAL at 10:19

## 2019-08-20 RX ADMIN — OXYCODONE HYDROCHLORIDE 5 MG: 5 TABLET ORAL at 01:50

## 2019-08-20 RX ADMIN — VANCOMYCIN HYDROCHLORIDE 1500 MG: 10 INJECTION, POWDER, LYOPHILIZED, FOR SOLUTION INTRAVENOUS at 01:12

## 2019-08-20 RX ADMIN — ASPIRIN 325 MG: 325 TABLET, COATED ORAL at 20:27

## 2019-08-20 RX ADMIN — ASPIRIN 325 MG: 325 TABLET, COATED ORAL at 10:16

## 2019-08-20 RX ADMIN — ACETAMINOPHEN 650 MG: 325 TABLET ORAL at 01:12

## 2019-08-20 RX ADMIN — OXYCODONE HYDROCHLORIDE 10 MG: 5 TABLET ORAL at 20:27

## 2019-08-20 RX ADMIN — DOCUSATE SODIUM 100 MG: 100 CAPSULE, LIQUID FILLED ORAL at 20:27

## 2019-08-20 RX ADMIN — DOCUSATE SODIUM 100 MG: 100 CAPSULE, LIQUID FILLED ORAL at 10:16

## 2019-08-20 RX ADMIN — Medication 10 ML: at 20:27

## 2019-08-20 RX ADMIN — PREDNISONE 10 MG: 10 TABLET ORAL at 10:16

## 2019-08-20 RX ADMIN — ACETAMINOPHEN 650 MG: 325 TABLET ORAL at 20:27

## 2019-08-20 RX ADMIN — MELOXICAM 3.75 MG: 7.5 TABLET ORAL at 10:19

## 2019-08-20 RX ADMIN — OXYCODONE HYDROCHLORIDE 5 MG: 5 TABLET ORAL at 15:52

## 2019-08-20 ASSESSMENT — PAIN DESCRIPTION - PAIN TYPE
TYPE: SURGICAL PAIN

## 2019-08-20 ASSESSMENT — PAIN DESCRIPTION - ORIENTATION
ORIENTATION: LEFT

## 2019-08-20 ASSESSMENT — PAIN DESCRIPTION - ONSET
ONSET: ON-GOING
ONSET: ON-GOING
ONSET: AWAKENED FROM SLEEP
ONSET: ON-GOING

## 2019-08-20 ASSESSMENT — PAIN DESCRIPTION - DESCRIPTORS
DESCRIPTORS: ACHING

## 2019-08-20 ASSESSMENT — PAIN DESCRIPTION - LOCATION
LOCATION: HIP

## 2019-08-20 ASSESSMENT — PAIN SCALES - GENERAL
PAINLEVEL_OUTOF10: 7
PAINLEVEL_OUTOF10: 3
PAINLEVEL_OUTOF10: 0
PAINLEVEL_OUTOF10: 4
PAINLEVEL_OUTOF10: 3
PAINLEVEL_OUTOF10: 5
PAINLEVEL_OUTOF10: 5
PAINLEVEL_OUTOF10: 4
PAINLEVEL_OUTOF10: 6
PAINLEVEL_OUTOF10: 0

## 2019-08-20 ASSESSMENT — PAIN DESCRIPTION - FREQUENCY
FREQUENCY: INTERMITTENT
FREQUENCY: CONTINUOUS

## 2019-08-20 ASSESSMENT — PAIN DESCRIPTION - PROGRESSION
CLINICAL_PROGRESSION: GRADUALLY WORSENING
CLINICAL_PROGRESSION: NOT CHANGED
CLINICAL_PROGRESSION: GRADUALLY WORSENING

## 2019-08-20 ASSESSMENT — PAIN - FUNCTIONAL ASSESSMENT
PAIN_FUNCTIONAL_ASSESSMENT: ACTIVITIES ARE NOT PREVENTED
PAIN_FUNCTIONAL_ASSESSMENT: PREVENTS OR INTERFERES SOME ACTIVE ACTIVITIES AND ADLS
PAIN_FUNCTIONAL_ASSESSMENT: ACTIVITIES ARE NOT PREVENTED
PAIN_FUNCTIONAL_ASSESSMENT: ACTIVITIES ARE NOT PREVENTED

## 2019-08-20 NOTE — PROGRESS NOTES
Patient is a/o x4. Patient c/o nausea. Patient c/o pain w/movement. VSS. Non-skid socks on, SCD'S remain in place. Patient x 2 SBA w/gait belt and walker stand/pivot to BSC, tolerates fairly well. Fall precautions in place. Bed locked and in lowest position, bedside table and nurse call light within reach. Instructed patient to call out for assistance. Patient remains free from falls at this time, will continue to monitor.

## 2019-08-20 NOTE — PROGRESS NOTES
degrees) and she verbalized understanding.    Safety Devices  Type of devices: Nurse notified, Call light within reach, Left in bed, Bed alarm in place     Therapy Time   Individual Concurrent Group Co-treatment   Time In 1301         Time Out 1328         Minutes 27           Timed Code Treatment Minutes: 27      Total Treatment Minutes:  1316 Kim Burkett, PT

## 2019-08-20 NOTE — CARE COORDINATION
CTC attempted to meet with the Pt to complete the Inpatient Interview. Pt is with a friend and requested that CTC come back at a later time. ALICIA LongoriaN, RN  Care Transition Coordinator  Contact Number:  [phone]

## 2019-08-20 NOTE — PROGRESS NOTES
Occupational Therapy   Occupational Therapy Initial Assessment and Treatment  Date: 2019   Patient Name: Donnell Tiwari  MRN: [id number]     : 1950    Date of Service: 2019    Discharge Recommendations: Donnell Tiwari scored a 19/24 on the AM-PAC ADL Inpatient form. Current research shows that an AM-PAC score of 18 or greater is typically associated with a discharge to the patient's home setting. Based on the patients AM-PAC score and their current ADL deficits, it is recommended that the patient have 2-3 sessions per week of Occupational Therapy at d/c to increase the patients independence. Assessment: Pt presents below functional baseline s/p L HERIBERTO. Pt is limited by anterior hip precautions, fatigue, and weakness. Education and demonstration given to ensure good carryover with hip precautions. Pt lives alone and plans to return at discharge with intermittent assistance from her son. Feel pt would benefit from continued therapy while inpt and home OT at d/c to ensure safe d/c dispo. OT Equipment Recommendations  Equipment Needed: No    Assessment   Performance deficits / Impairments: Decreased functional mobility ; Decreased endurance;Decreased ADL status; Decreased strength;Decreased balance    Treatment Diagnosis: Impaired ADLs, activity tolerance and mobility    Patient Education: Role of OT, anterior hip precautions- pt verb understanding, handout provided    REQUIRES OT FOLLOW UP: Yes    Activity Tolerance: Patient Tolerated treatment well    Safety Devices in place: Yes  Type of devices: Left in chair;Nurse notified; Chair alarm in place;Call light within reach           Treatment Diagnosis: Impaired ADLs, activity tolerance and mobility      Restrictions  Position Activity Restriction  Other position/activity restrictions: FWBAT, Anterior Approach, No flexion past 100 degrees    Subjective   General  Chart Reviewed: Yes    Additional Pertinent Hx: 77 y.o. F adm  for L HERIBERTO.    PMHx=

## 2019-08-20 NOTE — PROGRESS NOTES
per Dr. Lenny Knight. Pmhx: DM,depression, OA. Family / Caregiver Present: No  Diagnosis: L THR  Follows Commands: Within Functional Limits  Subjective  Subjective: Pt found up in chair and agreeable to PT. \" I'm going home tomorrow. \"  Pain Screening  Patient Currently in Pain: Yes(rates hip pain 4/10, RN aware)         Orientation  Orientation  Overall Orientation Status: Within Functional Limits     Social/Functional History  Social/Functional History  Lives With: Alone  Type of Home: House  Home Layout: Two level(Laundry on main level)  Home Access: (1 + 1 CROW without rail. 1 step up + 2 steps down to access 1st floor bathroom. 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 Pt's son will check on her frequently, but she will primarily be by herself. Objective        Bed mobility  Sit to Supine: Moderate assistance(for L LE; max vc for no bridging)  Scooting: Supervision     Transfers  Sit to Stand: Contact guard assistance(x2 trials)  Stand to sit: Contact guard assistance(x2 trials)     Ambulation  Device: Rolling Walker  Assistance: Contact guard assistance  Quality of Gait: slow sami with step to pattern leading L; WBAT L with occ vc for safe sequence  Distance: 70 ft, 20 ft        Exercises  Comments: Pt demo THR HEP x10 L LE with max assist for SLR, HS, hip abduction, SAQ.       Plan   Plan  Times per week: 7  Times per day: Twice a day  Current Treatment

## 2019-08-21 VITALS
RESPIRATION RATE: 16 BRPM | TEMPERATURE: 97.9 F | WEIGHT: 225 LBS | HEART RATE: 84 BPM | OXYGEN SATURATION: 93 % | SYSTOLIC BLOOD PRESSURE: 106 MMHG | DIASTOLIC BLOOD PRESSURE: 53 MMHG | HEIGHT: 69 IN | BODY MASS INDEX: 33.33 KG/M2

## 2019-08-21 PROBLEM — E07.9 THYROID DISEASE: Status: ACTIVE | Noted: 2019-08-21

## 2019-08-21 PROBLEM — D50.0 BLOOD LOSS ANEMIA: Status: ACTIVE | Noted: 2019-08-21

## 2019-08-21 PROBLEM — E11.9 DIABETES MELLITUS (HCC): Status: ACTIVE | Noted: 2019-08-21

## 2019-08-21 PROBLEM — F32.A DEPRESSION: Status: ACTIVE | Noted: 2019-08-21

## 2019-08-21 LAB
BASOPHILS ABSOLUTE: 0 K/UL (ref 0–0.2)
BASOPHILS RELATIVE PERCENT: 0.4 %
EOSINOPHILS ABSOLUTE: 0 K/UL (ref 0–0.6)
EOSINOPHILS RELATIVE PERCENT: 0.1 %
GLUCOSE BLD-MCNC: 161 MG/DL (ref 70–99)
GLUCOSE BLD-MCNC: 179 MG/DL (ref 70–99)
GLUCOSE BLD-MCNC: 193 MG/DL (ref 70–99)
HCT VFR BLD CALC: 26.1 % (ref 36–48)
HEMOGLOBIN: 8.8 G/DL (ref 12–16)
LYMPHOCYTES ABSOLUTE: 2.1 K/UL (ref 1–5.1)
LYMPHOCYTES RELATIVE PERCENT: 21.9 %
MCH RBC QN AUTO: 29.8 PG (ref 26–34)
MCHC RBC AUTO-ENTMCNC: 33.6 G/DL (ref 31–36)
MCV RBC AUTO: 88.7 FL (ref 80–100)
MONOCYTES ABSOLUTE: 0.8 K/UL (ref 0–1.3)
MONOCYTES RELATIVE PERCENT: 8.1 %
NEUTROPHILS ABSOLUTE: 6.8 K/UL (ref 1.7–7.7)
NEUTROPHILS RELATIVE PERCENT: 69.5 %
PDW BLD-RTO: 13.9 % (ref 12.4–15.4)
PERFORMED ON: ABNORMAL
PLATELET # BLD: 216 K/UL (ref 135–450)
PMV BLD AUTO: 8.5 FL (ref 5–10.5)
RBC # BLD: 2.95 M/UL (ref 4–5.2)
WBC # BLD: 9.8 K/UL (ref 4–11)

## 2019-08-21 PROCEDURE — 6360000002 HC RX W HCPCS: Performed by: INTERNAL MEDICINE

## 2019-08-21 PROCEDURE — 6370000000 HC RX 637 (ALT 250 FOR IP): Performed by: ORTHOPAEDIC SURGERY

## 2019-08-21 PROCEDURE — 97116 GAIT TRAINING THERAPY: CPT

## 2019-08-21 PROCEDURE — 99221 1ST HOSP IP/OBS SF/LOW 40: CPT | Performed by: INTERNAL MEDICINE

## 2019-08-21 PROCEDURE — 2580000003 HC RX 258: Performed by: INTERNAL MEDICINE

## 2019-08-21 PROCEDURE — 97110 THERAPEUTIC EXERCISES: CPT

## 2019-08-21 PROCEDURE — 97530 THERAPEUTIC ACTIVITIES: CPT

## 2019-08-21 PROCEDURE — 6370000000 HC RX 637 (ALT 250 FOR IP): Performed by: INTERNAL MEDICINE

## 2019-08-21 PROCEDURE — 85025 COMPLETE CBC W/AUTO DIFF WBC: CPT

## 2019-08-21 PROCEDURE — 36415 COLL VENOUS BLD VENIPUNCTURE: CPT

## 2019-08-21 PROCEDURE — 97535 SELF CARE MNGMENT TRAINING: CPT

## 2019-08-21 RX ORDER — LEVOTHYROXINE SODIUM 0.05 MG/1
50 TABLET ORAL DAILY
Status: DISCONTINUED | OUTPATIENT
Start: 2019-08-21 | End: 2019-08-21 | Stop reason: HOSPADM

## 2019-08-21 RX ORDER — PREDNISONE 10 MG/1
10 TABLET ORAL DAILY
Qty: 7 TABLET | Refills: 0 | Status: SHIPPED | OUTPATIENT
Start: 2019-08-22 | End: 2019-08-29

## 2019-08-21 RX ORDER — DEXTROSE MONOHYDRATE 25 G/50ML
12.5 INJECTION, SOLUTION INTRAVENOUS PRN
Status: DISCONTINUED | OUTPATIENT
Start: 2019-08-21 | End: 2019-08-21 | Stop reason: HOSPADM

## 2019-08-21 RX ORDER — CEFADROXIL 500 MG/1
500 CAPSULE ORAL 2 TIMES DAILY
Qty: 14 CAPSULE | Refills: 0 | Status: SHIPPED | OUTPATIENT
Start: 2019-08-21 | End: 2019-08-28

## 2019-08-21 RX ORDER — NICOTINE POLACRILEX 4 MG
15 LOZENGE BUCCAL PRN
Status: DISCONTINUED | OUTPATIENT
Start: 2019-08-21 | End: 2019-08-21 | Stop reason: HOSPADM

## 2019-08-21 RX ORDER — FLUOXETINE HYDROCHLORIDE 20 MG/1
40 CAPSULE ORAL DAILY
Status: DISCONTINUED | OUTPATIENT
Start: 2019-08-21 | End: 2019-08-21 | Stop reason: HOSPADM

## 2019-08-21 RX ORDER — ATORVASTATIN CALCIUM 20 MG/1
20 TABLET, FILM COATED ORAL NIGHTLY
Status: DISCONTINUED | OUTPATIENT
Start: 2019-08-21 | End: 2019-08-21 | Stop reason: HOSPADM

## 2019-08-21 RX ORDER — DEXTROSE MONOHYDRATE 50 MG/ML
100 INJECTION, SOLUTION INTRAVENOUS PRN
Status: DISCONTINUED | OUTPATIENT
Start: 2019-08-21 | End: 2019-08-21 | Stop reason: HOSPADM

## 2019-08-21 RX ORDER — ARIPIPRAZOLE 2 MG/1
2 TABLET ORAL DAILY
Status: DISCONTINUED | OUTPATIENT
Start: 2019-08-21 | End: 2019-08-21 | Stop reason: HOSPADM

## 2019-08-21 RX ORDER — OXYCODONE HYDROCHLORIDE 5 MG/1
5-10 TABLET ORAL EVERY 4 HOURS PRN
Qty: 36 TABLET | Refills: 0 | Status: SHIPPED | OUTPATIENT
Start: 2019-08-21 | End: 2019-08-24

## 2019-08-21 RX ORDER — PSEUDOEPHEDRINE HCL 30 MG
100 TABLET ORAL 2 TIMES DAILY
Qty: 30 CAPSULE | Refills: 0 | Status: SHIPPED | OUTPATIENT
Start: 2019-08-21 | End: 2020-01-07 | Stop reason: ALTCHOICE

## 2019-08-21 RX ADMIN — DOCUSATE SODIUM 100 MG: 100 CAPSULE, LIQUID FILLED ORAL at 10:01

## 2019-08-21 RX ADMIN — ACETAMINOPHEN 650 MG: 325 TABLET ORAL at 09:58

## 2019-08-21 RX ADMIN — IRON SUCROSE 200 MG: 20 INJECTION, SOLUTION INTRAVENOUS at 10:59

## 2019-08-21 RX ADMIN — ARIPIPRAZOLE 2 MG: 2 TABLET ORAL at 09:58

## 2019-08-21 RX ADMIN — OXYCODONE HYDROCHLORIDE 5 MG: 5 TABLET ORAL at 07:34

## 2019-08-21 RX ADMIN — PREDNISONE 10 MG: 10 TABLET ORAL at 10:00

## 2019-08-21 RX ADMIN — LEVOTHYROXINE SODIUM 50 MCG: 50 TABLET ORAL at 09:58

## 2019-08-21 RX ADMIN — OXYCODONE HYDROCHLORIDE 10 MG: 5 TABLET ORAL at 02:11

## 2019-08-21 RX ADMIN — OXYCODONE HYDROCHLORIDE 5 MG: 5 TABLET ORAL at 11:31

## 2019-08-21 RX ADMIN — ACETAMINOPHEN 650 MG: 325 TABLET ORAL at 14:40

## 2019-08-21 RX ADMIN — MELOXICAM 3.75 MG: 7.5 TABLET ORAL at 09:58

## 2019-08-21 RX ADMIN — ASPIRIN 325 MG: 325 TABLET, COATED ORAL at 10:01

## 2019-08-21 RX ADMIN — OXYCODONE HYDROCHLORIDE 5 MG: 5 TABLET ORAL at 16:29

## 2019-08-21 RX ADMIN — FLUOXETINE HYDROCHLORIDE 40 MG: 20 CAPSULE ORAL at 09:58

## 2019-08-21 RX ADMIN — INSULIN LISPRO 1 UNITS: 100 INJECTION, SOLUTION INTRAVENOUS; SUBCUTANEOUS at 12:37

## 2019-08-21 ASSESSMENT — PAIN DESCRIPTION - ORIENTATION
ORIENTATION: LEFT

## 2019-08-21 ASSESSMENT — PAIN SCALES - GENERAL
PAINLEVEL_OUTOF10: 6
PAINLEVEL_OUTOF10: 3
PAINLEVEL_OUTOF10: 4
PAINLEVEL_OUTOF10: 4
PAINLEVEL_OUTOF10: 5
PAINLEVEL_OUTOF10: 6
PAINLEVEL_OUTOF10: 5
PAINLEVEL_OUTOF10: 7
PAINLEVEL_OUTOF10: 6
PAINLEVEL_OUTOF10: 7
PAINLEVEL_OUTOF10: 5

## 2019-08-21 ASSESSMENT — PAIN - FUNCTIONAL ASSESSMENT
PAIN_FUNCTIONAL_ASSESSMENT: PREVENTS OR INTERFERES SOME ACTIVE ACTIVITIES AND ADLS
PAIN_FUNCTIONAL_ASSESSMENT: ACTIVITIES ARE NOT PREVENTED
PAIN_FUNCTIONAL_ASSESSMENT: PREVENTS OR INTERFERES SOME ACTIVE ACTIVITIES AND ADLS
PAIN_FUNCTIONAL_ASSESSMENT: PREVENTS OR INTERFERES WITH MANY ACTIVE NOT PASSIVE ACTIVITIES
PAIN_FUNCTIONAL_ASSESSMENT: ACTIVITIES ARE NOT PREVENTED

## 2019-08-21 ASSESSMENT — PAIN DESCRIPTION - PROGRESSION
CLINICAL_PROGRESSION: GRADUALLY WORSENING

## 2019-08-21 ASSESSMENT — PAIN DESCRIPTION - LOCATION
LOCATION: HIP

## 2019-08-21 ASSESSMENT — PAIN DESCRIPTION - PAIN TYPE
TYPE: SURGICAL PAIN

## 2019-08-21 ASSESSMENT — PAIN DESCRIPTION - FREQUENCY
FREQUENCY: CONTINUOUS

## 2019-08-21 ASSESSMENT — PAIN DESCRIPTION - DESCRIPTORS
DESCRIPTORS: ACHING

## 2019-08-21 ASSESSMENT — PAIN DESCRIPTION - ONSET
ONSET: ON-GOING

## 2019-08-21 NOTE — PROGRESS NOTES
agreeable to OT treatment. Pain Assessment  Pain Level: 5  Vital Signs  Patient Currently in Pain: Yes(ice applied, RN aware)        Objective    ADL  Grooming: Independent(combing hair seated in recliner)  UE Dressing: Independent(donning bra and Tshirt seated on recliner chair)  LE Dressing: Contact guard assistance(donning underwear, pants and socks with AE and increased time for completion)        Balance  Sitting Balance: Independent  Standing Balance: Stand by assistance  Standing Balance  Time: 4 min + 2 min + 1min  Activity: functional mobility in room and down frankel, sit-stand transfers, clothing managment   Functional Mobility  Functional - Mobility Device: Rolling Walker  Activity: Other;Retrieve items(down frankel)  Assist Level: Stand by assistance  Bed mobility  Supine to Sit: Stand by assistance  Scooting: Stand by assistance  Transfers  Stand Step Transfers: Contact guard assistance(from EOB to recliner chair )  Sit to stand: Contact guard assistance  Stand to sit: Contact guard assistance  Transfer Comments: min VCs for maintaining Hip precautions                     Plan  If pt discharges prior to next treatment, this note will serve as discharge summary  Plan  Times per week: 7x  Times per day: Daily    AM-PAC Score        AM-[facility] Inpatient Daily Activity Raw Score: 21 (08/21/19 1535)  AM-PAC Inpatient ADL T-Scale Score : 44.27 (08/21/19 1535)  ADL Inpatient CMS 0-100% Score: 32.79 (08/21/19 1535)  ADL Inpatient CMS G-Code Modifier : Yehuda Santana (08/21/19 1535)    Goals (as determined and assessed by primary OT)  Short term goals  Time Frame for Short term goals: By d/c   Short term goal 1: Toilet/chair transfers with mod I - ongoing  Short term goal 2: Lower body dressing with sock aide/reacher prn and setup/spvn - ongoing  Short term goal 3: Standing x8 mins for item retrieval/ADLs with spvn - ongoing  Short term goal 4: Simulated shower transfer with SBA - ongoing  Patient Goals   Patient goals :  To return

## 2019-08-21 NOTE — PROGRESS NOTES
Patient is a/o x4. Surgical site to hip intact w/small serosanguinous drainage noted. Patient denies c/o nausea/pain. VSS. Non-skid socks on, SCD'S remain in place. Patient x 1 SBA w/gait belt and walker to bathroom, return to bed, tolerates well. Fall precautions in place. Bed locked and in lowest position, bedside table and nurse call light within reach. Instructed patient to call out for assistance. Patient remains free from falls at this time, will continue to monitor.

## 2019-08-21 NOTE — PLAN OF CARE
Problem: Falls - Risk of:  Goal: Will remain free from falls  Description  Will remain free from falls  8/20/2019 2157 by Yair Myles RN  Outcome: Ongoing  Note:   Hourly rounding on patient for needs. Non-skid socks on, bed in lowest position and locked. Bedside table, personal belongs, and nurse call light within reach. Instructed patient to use call light for assistance. Bed alarm on. Floor clear of clutter. Patient remains free of falls at this time. Will continue to monitor. Problem: Infection - Surgical Site:  Goal: Signs of wound healing will improve  Description  Signs of wound healing will improve  Outcome: Ongoing  Note:   SPIKE surgical site d/t dressing, dressing is intact, new drainage noted, scant/serosanguinous. No odor noted, will continue to monitor. Problem: Pain - Acute:  Goal: Pain level will decrease  Description  Pain level will decrease  Outcome: Ongoing  Note:   Patient rates pain to L hip 8/10, patient states she thinks the block is beginning to wear off. Describe numbness at top of thigh only now. PRN pain medication given, upon reassessment patient verbalized satisfaction.

## 2019-08-21 NOTE — PROGRESS NOTES
Physical Therapy  Facility/Department: [facility] 5T ORTHO/NEURO  Daily Treatment Note  NAME: Miguel Moran  : 1950  MRN: [id number]    Date of Service: 2019    Discharge Recommendations: Miguel Moran scored a 18/24 on the AM-PAC short mobility form. Current research shows that an AM-PAC score of 18 or greater is typically associated with a discharge to the patient's home setting. Based on the patients AM-PAC score and their current functional mobility deficits, it is recommended that the patient have 2-3 sessions per week of Physical Therapy at d/c to increase the patients independence. PT Equipment Recommendations  Equipment Needed: No  Assessment   Assessment: PM session was limited due to an increase in pain from our previous session. Continues to demonstrated safe and steady functional mobility without cues. Treatment Diagnosis: mobility impairment due to L THR  Decision Making: Medium Complexity  REQUIRES PT FOLLOW UP: Yes     Patient Diagnosis(es): There were no encounter diagnoses. has a past medical history of Arthritis, Depression, Diabetes mellitus ([facility] [address] Risk for falls, S/P tubal ligation, and Thyroid disease. has a past surgical history that includes fracture surgery (Bilateral, 2008); joint replacement (Left); joint replacement (Right); fracture surgery (Bilateral); and Total hip arthroplasty (Left, 2019). Restrictions  Position Activity Restriction  Other position/activity restrictions: FWBAT, Anterior Approach, No flexion past 100 degrees  Subjective   General  Additional Pertinent Hx: 76 yo admitted 19 for L THR per Dr. Latesha Pederson. Pmhx: DM,depression, OA.    Subjective  Subjective: Pt was in the semi cotto position willing to participate   Pain Screening  Patient Currently in Pain: Yes  Pain Assessment  Pain Level: 4  Pain Type: Surgical pain  Pain Location: Hip  Pain Orientation: Left  Vital Signs  Patient Currently in Pain: Yes  Orientation  Orientation  Overall

## 2019-08-21 NOTE — PROGRESS NOTES
Physical Therapy  Facility/Department: [facility] 5T ORTHO/NEURO  Daily Treatment Note  NAME: Jean Patricia  : 1950  MRN: [id number]    Date of Service: 2019    Discharge Recommendations: Jean Patricia scored a 18/24 on the AM-PAC short mobility form. Current research shows that an AM-PAC score of 18 or greater is typically associated with a discharge to the patient's home setting. Based on the patients AM-PAC score and their current functional mobility deficits, it is recommended that the patient have 2-3 sessions per week of Physical Therapy at d/c to increase the patients independence. PT Equipment Recommendations  Equipment Needed: No    Assessment   Assessment: Pt is progressing well with a decrease in pain with mobility. Presented with good trunk and LE control during bed mobility with the bed flat and without use of the handrails. Transfers and gait training were steady and safe without sequencing cues. Treatment Diagnosis: mobility impairment due to L THR  Decision Making: Medium Complexity  REQUIRES PT FOLLOW UP: Yes     Patient Diagnosis(es): There were no encounter diagnoses. has a past medical history of Arthritis, Depression, Diabetes mellitus ([facility] [address] Risk for falls, S/P tubal ligation, and Thyroid disease. has a past surgical history that includes fracture surgery (Bilateral, 2008); joint replacement (Left); joint replacement (Right); fracture surgery (Bilateral); and Total hip arthroplasty (Left, 2019). Restrictions  Position Activity Restriction  Other position/activity restrictions: FWBAT, Anterior Approach, No flexion past 100 degrees  Subjective   General  Additional Pertinent Hx: 76 yo admitted 19 for L THR per Dr. Pratik Samuel. Pmhx: DM,depression, OA.    Subjective  Subjective: Pt was in the semi cotto position willing to participate   Pain Screening  Patient Currently in Pain: Yes  Pain Assessment  Pain Level: 4  Pain Type: Surgical pain  Pain Location: Hip  Pain reach, Chair alarm in place, Left in chair, Gait belt     Therapy Time   Individual Concurrent Group Co-treatment   Time In 1200         Time Out 1230         Minutes 30         Timed Code Treatment Minutes: [place] [address]     Addendum: 0 goals met. Continue per POC. This note will serve as a discharge summary if patient is discharged from hospital before next treatment session.   Krysten Muñoz, PT

## 2019-08-21 NOTE — DISCHARGE SUMMARY
medication will be re-evaluated at her post op visit in 2 weeks. Patient was educated on dosing expectations and limits of prescribing as a result of the new [facility] Board rules enacted August 31, 2017. Please also note that this is not the initial opoid prescription issued to this patient but a continuation of medication utilized during the hospital admission as noted in the medical record. OARRS report has also been utilized to screen for any abuse history or suspicious activity as outlined in [place]. All efforts have been taken to prevent abuse potential and misuse of opioid medications including education, screening, and close clinical follow up.

## 2019-08-21 NOTE — CONSULTS
[address]               [address] [place], [address]                                  CONSULTATION    PATIENT NAME: Alex Romano                        :        1950  MED REC NO:   [id number]                          ROOM:       1  ACCOUNT NO:   [de-identified]                           ADMIT DATE: 2019  PROVIDER:     Karla Villarreal MD    CONSULT DATE:  2019    INTERNAL MEDICINE CONSULTATION    HISTORY OF PRESENT ILLNESS:  This patient is a 79-year-old white female  with a history of non-insulin-dependent diabetes, a history of  depression, a history of thyroid disease, hypothyroidism, and a history  of DJD who comes in for a hip replacement. Postoperatively, she denies  any chest pain, shortness of breath, or any other problems. PRESENT MEDICATIONS:  As per the med sheet. SOCIAL HISTORY:  She lives with her son. She does not smoke, quit in   and does not drink significant alcohol. FAMILY HISTORY:  Otherwise noncontributory. REVIEW OF SYSTEMS:  Otherwise noncontributory. PHYSICAL EXAMINATION:  GENERAL:  She appears in no acute distress. VITAL SIGNS:  Blood pressure 100/60, pulse rate 60, respiratory rate 12  and easy. She is afebrile. HEENT:  Head, atraumatic, normocephalic. Eyes, sclerae noninjected. NECK:  No jugular venous distention. No adenopathy. LUNGS:  Clear. HEART:  Normal S1, S2 without any murmurs or gallops. ABDOMEN:  Soft without tenderness. EXTREMITIES:  Reveal evidence of a hip replacement. No cyanosis,  clubbing, or edema. LABORATORY DATA:  Labs preoperatively showed normal electrolytes with a  BUN and creatinine of 15 and 0.7. Blood sugar was 138, glycohemoglobin  6.2%. Hemoglobin and hematocrit 13.8 and 40.4, white count 6.7, and  platelets 940. Postoperatively, her hemoglobin and hematocrit are 8.8  and 26.1, white count normal at 9.8, and platelets 975.   Last blood  sugar was

## 2019-08-21 NOTE — CARE COORDINATION
Case Management Assessment           Initial Evaluation                Date / Time of Evaluation: 8/21/2019 1:22 PM                 Assessment Completed by: Gumaro Ziegler    Patient Name: Sonny Flynn     YOB: 1950  Diagnosis: Osteoarthritis of left hip, unspecified osteoarthritis type [M16.12]     Date / Time: 8/19/2019  9:34 AM    Patient Admission Status: Inpatient    If patient is discharged prior to next notation, then this note serves as note for discharge by case management. Current PCP: Alejandro Hamilton Patient: No    Chart Reviewed: Yes  Patient/ Family Interviewed: Yes    Initial assessment completed at bedside with: patient    Hospitalization in the last 30 days: No    Emergency Contacts:  Extended Emergency Contact Information  Primary Emergency Contact: Arian Phone: [phone]  Relation: Child  Secondary Emergency Contact: Kassidy Balbuena [address] Phone: [phone]  Relation: Child    Advance Directives:   Code Status: Full [address]: No  Agent: NA  Contact Number: NA    Copy present: No     In paper Chart: No    Scanned into EMR No    Financial  Payor: MEDICARE / Plan: MEDICARE PART A AND B / Product Type: *No Product type* /     Pre-cert required for SNF: No    Pharmacy    [facility] [address] [place] [address] Shane Kelvin Barry [phone]  [address] [place] [address]  Phone: [phone] Fax: [phone]      Potential assistance Purchasing Medications: Potential Assistance Purchasing Medications: No  Does Patient want to participate in local refill/ meds to beds program?: Yes    Meds To Beds General Rules:  1. Can ONLY be done Monday- Friday between 8:30am-5pm  2. Prescription(s) must be in pharmacy by 3pm to be filled same day  3. Copy of patient's insurance/ prescription drug card and patient face sheet must be sent along with the prescription(s)  4. Cost of Rx cannot be added to hospital bill. If financial assistance is needed, please contact unit  or ;  or  CANNOT provide pharmacy voucher for patients co-pays  5. Patients can then  the prescription on their way out of the hospital at discharge, or pharmacy can deliver to the bedside if staff is available. (payment due at time of pick-up or delivery - cash, check, or card accepted)     Able to afford home medications/ co-pay costs: Yes    ADLS  Support Systems: Children, Family Members, Friends/Neighbors    PT AM-PAC: 18 /24  OT AM-PAC: 19 /24    [place]: two level house  Steps: 2    Plans to RETURN to current housing: Yes  Barriers to RETURNING to current housing: none noted    Nabeel Calderon  Currently ACTIVE with [address]: Yes  [address]: [facility]   Phone: [phone]  Fax: 21 [phone]:  Disposition: Home with [address] [place] [address] for discharge: family     Factors facilitating achievement of predicted outcomes: Family support, Cooperative and Pleasant    Barriers to discharge: none noted    Additional Case Management Notes: faxed all orders to [facility] for start of care. Pt would like medications filled through meds-beds and pt has a walker at home already. Asuncion Durbin and her family were provided with choice of provider; she and her family are in agreement with the discharge plan.     Care Transition patient: Chetna Mancuso RN  [facility]  Case Management Department  Ph: [phone]   Fax: [phone]

## 2019-08-22 ENCOUNTER — CARE COORDINATION (OUTPATIENT)
Dept: CASE MANAGEMENT | Age: 69
End: 2019-08-22

## 2019-08-22 NOTE — CARE COORDINATION
CTC attempted to meet with the Pt to complete the Inpatient Interview. Pt is with staff nurse at this time. CTC will continue to follow. Sheala V. Charissa Gottron, BSN, RN  Care Transition Coordinator  Contact Number:  [phone]

## 2019-08-22 NOTE — CARE COORDINATION
[facility] Transitions Initial Follow Up Call    Call within 2 business days of discharge: Yes    Patient: Karen Mendoza  Patient : 1950   MRN: [id number]   Reason for Admission:  Left hip replacement   Discharge Date: 19 RARS: Readmission Risk Score: 11      Last Discharge [facility]       Complaint Diagnosis Description Type Department Provider    19  Status post total hip replacement, left Admission (Discharged) Lottie Islas 5T Adelfo Dent MD           Spoke with: [address] [place] [address]: [facility]      Non-face-to-face services provided:  Obtained and reviewed discharge summary and/or continuity of care documents  Education of patient/family/caregiver/guardian to support self-management-   Assessment and support for treatment adherence and medication management-     Care [facility] 24 Hour Call    Schedule Follow Up Appointment with PCP:  Declined  Do you have any ongoing symptoms?:  Yes  Patient-reported symptoms:  Pain, Other (Comment: swelling to left hip)  Interventions for patient-reported symptoms:  Other (Comment: Continue taking meds as prescribed, keep / schedule appts, s/s that require med attn)  Do you have a copy of your discharge instructions?:  Yes  Do you have all of your prescriptions and are they filled?:  Yes  Have you been contacted by a [facility] Pharmacist?:  No  Have you scheduled your follow up appointment?:  Yes  Were you discharged with any Home Care or Post Acute Services:  Yes  Post Acute Services:  Home Health (Comment: [facility])  Do you feel like you have everything you need to keep you well at home?:  Yes  Care Transitions Interventions  No Identified Needs       Summary  CTC spoke to the Pt who denies fever, chills, nausea, vomiting, chest pain, SOB, cough, and difficulty emptying her bladder. Pt reports that swelling to left hip is better and she continues to ice it as recommended.   Pt also reports numbness to inner left leg and rates pain to left

## 2019-08-26 ENCOUNTER — TELEPHONE (OUTPATIENT)
Dept: ORTHOPEDIC SURGERY | Age: 69
End: 2019-08-26

## 2019-08-26 DIAGNOSIS — M16.12 PRIMARY OSTEOARTHRITIS OF LEFT HIP: Primary | ICD-10-CM

## 2019-08-26 RX ORDER — OXYCODONE HYDROCHLORIDE AND ACETAMINOPHEN 5; 325 MG/1; MG/1
1 TABLET ORAL EVERY 6 HOURS PRN
Qty: 28 TABLET | Refills: 0 | Status: SHIPPED | OUTPATIENT
Start: 2019-08-26 | End: 2019-09-02

## 2019-08-28 ENCOUNTER — TELEPHONE (OUTPATIENT)
Dept: ORTHOPEDIC SURGERY | Age: 69
End: 2019-08-28

## 2019-08-28 DIAGNOSIS — M16.12 PRIMARY OSTEOARTHRITIS OF LEFT HIP: ICD-10-CM

## 2019-08-28 DIAGNOSIS — M51.37 DEGENERATION OF LUMBAR OR LUMBOSACRAL INTERVERTEBRAL DISC: Primary | Chronic | ICD-10-CM

## 2019-08-29 RX ORDER — OXYCODONE HYDROCHLORIDE AND ACETAMINOPHEN 5; 325 MG/1; MG/1
1 TABLET ORAL EVERY 6 HOURS PRN
Qty: 28 TABLET | Refills: 0 | Status: SHIPPED | OUTPATIENT
Start: 2019-08-29 | End: 2019-09-05

## 2019-09-03 ENCOUNTER — OFFICE VISIT (OUTPATIENT)
Dept: ORTHOPEDIC SURGERY | Age: 69
End: 2019-09-03

## 2019-09-03 VITALS
HEIGHT: 69 IN | SYSTOLIC BLOOD PRESSURE: 134 MMHG | HEART RATE: 81 BPM | WEIGHT: 225.09 LBS | DIASTOLIC BLOOD PRESSURE: 60 MMHG | BODY MASS INDEX: 33.34 KG/M2

## 2019-09-03 DIAGNOSIS — Z96.642 STATUS POST TOTAL HIP REPLACEMENT, LEFT: ICD-10-CM

## 2019-09-03 PROCEDURE — 99024 POSTOP FOLLOW-UP VISIT: CPT | Performed by: ORTHOPAEDIC SURGERY

## 2019-09-04 NOTE — PROGRESS NOTES
range of motion and strengthening. They will continue ice and elevation for the hip. They will continue aspirin therapy for DVT prophylaxis.

## 2019-09-06 ENCOUNTER — CARE COORDINATION (OUTPATIENT)
Dept: CASE MANAGEMENT | Age: 69
End: 2019-09-06

## 2019-09-12 RX ORDER — VITAMIN A, VITAMIN C, VITAMIN D-3, VITAMIN E, VITAMIN B-1, VITAMIN B-2, NIACIN, VITAMIN B-6, CALCIUM, IRON, ZINC, COPPER 4000; 120; 400; 22; 1.84; 3; 20; 10; 1; 12; 200; 27; 25; 2 [IU]/1; MG/1; [IU]/1; MG/1; MG/1; MG/1; MG/1; MG/1; MG/1; UG/1; MG/1; MG/1; MG/1; MG/1
TABLET ORAL
Qty: 60 TABLET | Refills: 1 | Status: SHIPPED | OUTPATIENT
Start: 2019-09-12 | End: 2019-10-09 | Stop reason: SDUPTHER

## 2019-09-17 ENCOUNTER — TELEPHONE (OUTPATIENT)
Dept: ORTHOPEDIC SURGERY | Age: 69
End: 2019-09-17

## 2019-09-17 ENCOUNTER — OFFICE VISIT (OUTPATIENT)
Dept: ORTHOPEDIC SURGERY | Age: 69
End: 2019-09-17

## 2019-09-17 VITALS
DIASTOLIC BLOOD PRESSURE: 60 MMHG | HEART RATE: 82 BPM | HEIGHT: 69 IN | BODY MASS INDEX: 33.34 KG/M2 | SYSTOLIC BLOOD PRESSURE: 134 MMHG | WEIGHT: 225.09 LBS

## 2019-09-17 DIAGNOSIS — Z96.642 STATUS POST TOTAL HIP REPLACEMENT, LEFT: Primary | ICD-10-CM

## 2019-09-17 PROCEDURE — 99024 POSTOP FOLLOW-UP VISIT: CPT | Performed by: ORTHOPAEDIC SURGERY

## 2019-10-01 ENCOUNTER — OFFICE VISIT (OUTPATIENT)
Dept: ORTHOPEDIC SURGERY | Age: 69
End: 2019-10-01

## 2019-10-01 VITALS
HEIGHT: 69 IN | DIASTOLIC BLOOD PRESSURE: 61 MMHG | BODY MASS INDEX: 33.34 KG/M2 | WEIGHT: 225.09 LBS | HEART RATE: 80 BPM | SYSTOLIC BLOOD PRESSURE: 125 MMHG

## 2019-10-01 DIAGNOSIS — Z96.642 STATUS POST TOTAL HIP REPLACEMENT, LEFT: Primary | ICD-10-CM

## 2019-10-01 PROCEDURE — 99024 POSTOP FOLLOW-UP VISIT: CPT | Performed by: ORTHOPAEDIC SURGERY

## 2019-10-09 RX ORDER — VITAMIN A, VITAMIN C, VITAMIN D-3, VITAMIN E, VITAMIN B-1, VITAMIN B-2, NIACIN, VITAMIN B-6, CALCIUM, IRON, ZINC, COPPER 4000; 120; 400; 22; 1.84; 3; 20; 10; 1; 12; 200; 27; 25; 2 [IU]/1; MG/1; [IU]/1; MG/1; MG/1; MG/1; MG/1; MG/1; MG/1; UG/1; MG/1; MG/1; MG/1; MG/1
TABLET ORAL
Qty: 60 TABLET | Refills: 1 | Status: SHIPPED | OUTPATIENT
Start: 2019-10-09 | End: 2019-10-31 | Stop reason: SDUPTHER

## 2019-10-11 ENCOUNTER — CARE COORDINATION (OUTPATIENT)
Dept: CASE MANAGEMENT | Age: 69
End: 2019-10-11

## 2019-10-31 RX ORDER — VITAMIN A, VITAMIN C, VITAMIN D-3, VITAMIN E, VITAMIN B-1, VITAMIN B-2, NIACIN, VITAMIN B-6, CALCIUM, IRON, ZINC, COPPER 4000; 120; 400; 22; 1.84; 3; 20; 10; 1; 12; 200; 27; 25; 2 [IU]/1; MG/1; [IU]/1; MG/1; MG/1; MG/1; MG/1; MG/1; MG/1; UG/1; MG/1; MG/1; MG/1; MG/1
TABLET ORAL
Qty: 60 TABLET | Refills: 1 | Status: SHIPPED | OUTPATIENT
Start: 2019-10-31 | End: 2020-03-10

## 2019-11-19 ENCOUNTER — CARE COORDINATION (OUTPATIENT)
Dept: CASE MANAGEMENT | Age: 69
End: 2019-11-19

## 2019-12-02 RX ORDER — VITAMIN A, VITAMIN C, VITAMIN D-3, VITAMIN E, VITAMIN B-1, VITAMIN B-2, NIACIN, VITAMIN B-6, CALCIUM, IRON, ZINC, COPPER 4000; 120; 400; 22; 1.84; 3; 20; 10; 1; 12; 200; 27; 25; 2 [IU]/1; MG/1; [IU]/1; MG/1; MG/1; MG/1; MG/1; MG/1; MG/1; UG/1; MG/1; MG/1; MG/1; MG/1
TABLET ORAL
Qty: 60 TABLET | Refills: 1 | Status: SHIPPED | OUTPATIENT
Start: 2019-12-02 | End: 2020-08-25

## 2020-01-07 ENCOUNTER — OFFICE VISIT (OUTPATIENT)
Dept: ORTHOPEDIC SURGERY | Age: 70
End: 2020-01-07
Payer: MEDICARE

## 2020-01-07 VITALS
DIASTOLIC BLOOD PRESSURE: 71 MMHG | HEIGHT: 69 IN | BODY MASS INDEX: 33.34 KG/M2 | HEART RATE: 87 BPM | WEIGHT: 225.09 LBS | SYSTOLIC BLOOD PRESSURE: 123 MMHG

## 2020-01-07 PROCEDURE — G8417 CALC BMI ABV UP PARAM F/U: HCPCS | Performed by: PHYSICIAN ASSISTANT

## 2020-01-07 PROCEDURE — 4040F PNEUMOC VAC/ADMIN/RCVD: CPT | Performed by: PHYSICIAN ASSISTANT

## 2020-01-07 PROCEDURE — 1036F TOBACCO NON-USER: CPT | Performed by: PHYSICIAN ASSISTANT

## 2020-01-07 PROCEDURE — G8484 FLU IMMUNIZE NO ADMIN: HCPCS | Performed by: PHYSICIAN ASSISTANT

## 2020-01-07 PROCEDURE — G8427 DOCREV CUR MEDS BY ELIG CLIN: HCPCS | Performed by: PHYSICIAN ASSISTANT

## 2020-01-07 PROCEDURE — G8400 PT W/DXA NO RESULTS DOC: HCPCS | Performed by: PHYSICIAN ASSISTANT

## 2020-01-07 PROCEDURE — 1123F ACP DISCUSS/DSCN MKR DOCD: CPT | Performed by: PHYSICIAN ASSISTANT

## 2020-01-07 PROCEDURE — 1090F PRES/ABSN URINE INCON ASSESS: CPT | Performed by: PHYSICIAN ASSISTANT

## 2020-01-07 PROCEDURE — 99213 OFFICE O/P EST LOW 20 MIN: CPT | Performed by: PHYSICIAN ASSISTANT

## 2020-01-07 PROCEDURE — 3017F COLORECTAL CA SCREEN DOC REV: CPT | Performed by: PHYSICIAN ASSISTANT

## 2020-01-07 NOTE — PROGRESS NOTES
Patient Name: Reji Higuera MRN: [id number]   Age: 77 y.o. YOB: 1950   Sex: female         [address] Complaint   Patient presents with    Follow-up     LT HERIBERTO 8/19/19       HISTORY OF PRESENT ILLNESS   Patient returns for their fourth postoperative evaluation status post left total hip replacement. The patient is doing very well. They have minimal complaints of pain. improved gait and posture. Rates her pain as a 1 out of 10 and she is doing very well still has mild weakness in the quadriceps of the left leg and hip flexors left leg but they are getting better she is increasing her activity and resuming her exercises that she has stopped since completed physical therapy previously. There is minimal swelling about the Hip. The patient has been doing home physical therapy. They deny any calf swelling or numbness or tingling down the leg     Assessment   PHYSICAL EXAM   Vital Signs:    Vitals:    01/07/20 1341   BP: 123/71   Pulse: 87       Examination of the hip shows a well-healed incision distally . no signs of infection      There is mild swelling. There is no drainage. Range of motion is up to 110. There is no calf tenderness. The patient is neurovascularly intact. No signs of DVT. Calf nontender    Gross distal sensation is decreased in the lateral thigh from the area of the incision as expected. RADIOLOGY   X-Rays reviewed: 2 views of the left hip AP and lateral x-rays of the hip performed and reviewed today and show excellent alignment of the total hip prosthesis. There is no loosening no fractures noted. IMPRESSION   6 months status post left total hip replacement    PLAN   The patient is doing well 6 months status post left total hip replacement.        We will continue home physical therapy for aggressive range of motion and strengthening.    -Advised patient that we could provide prescription for physical therapy if she feels she is having a

## 2020-03-10 ENCOUNTER — OFFICE VISIT (OUTPATIENT)
Dept: ORTHOPEDIC SURGERY | Age: 70
End: 2020-03-10
Payer: MEDICARE

## 2020-03-10 VITALS
BODY MASS INDEX: 33.34 KG/M2 | DIASTOLIC BLOOD PRESSURE: 66 MMHG | SYSTOLIC BLOOD PRESSURE: 133 MMHG | WEIGHT: 225.09 LBS | HEIGHT: 69 IN | HEART RATE: 88 BPM

## 2020-03-10 PROBLEM — M25.552 LEFT HIP PAIN: Status: ACTIVE | Noted: 2020-03-10

## 2020-03-10 PROCEDURE — 1123F ACP DISCUSS/DSCN MKR DOCD: CPT | Performed by: ORTHOPAEDIC SURGERY

## 2020-03-10 PROCEDURE — G8484 FLU IMMUNIZE NO ADMIN: HCPCS | Performed by: ORTHOPAEDIC SURGERY

## 2020-03-10 PROCEDURE — G8417 CALC BMI ABV UP PARAM F/U: HCPCS | Performed by: ORTHOPAEDIC SURGERY

## 2020-03-10 PROCEDURE — 3017F COLORECTAL CA SCREEN DOC REV: CPT | Performed by: ORTHOPAEDIC SURGERY

## 2020-03-10 PROCEDURE — G8427 DOCREV CUR MEDS BY ELIG CLIN: HCPCS | Performed by: ORTHOPAEDIC SURGERY

## 2020-03-10 PROCEDURE — G8400 PT W/DXA NO RESULTS DOC: HCPCS | Performed by: ORTHOPAEDIC SURGERY

## 2020-03-10 PROCEDURE — 99214 OFFICE O/P EST MOD 30 MIN: CPT | Performed by: ORTHOPAEDIC SURGERY

## 2020-03-10 PROCEDURE — 4040F PNEUMOC VAC/ADMIN/RCVD: CPT | Performed by: ORTHOPAEDIC SURGERY

## 2020-03-10 PROCEDURE — 1090F PRES/ABSN URINE INCON ASSESS: CPT | Performed by: ORTHOPAEDIC SURGERY

## 2020-03-10 PROCEDURE — 1036F TOBACCO NON-USER: CPT | Performed by: ORTHOPAEDIC SURGERY

## 2020-03-10 RX ORDER — TIZANIDINE 4 MG/1
4 TABLET ORAL 4 TIMES DAILY PRN
Qty: 40 TABLET | Refills: 0 | Status: SHIPPED | OUTPATIENT
Start: 2020-03-10 | End: 2021-03-30

## 2020-03-10 NOTE — PROGRESS NOTES
Patient Name: Fco Langston MRN: [id number]   Age: 72 y.o. YOB: 1950   Sex: female         [address] Complaint   Patient presents with    Hip Pain     LT HERIBERTO 8/19/19       HISTORY OF PRESENT ILLNESS         Currently:     Patient was walking over luggage and felt a sudden onset of pain in the left hip in the groin as well as laterally. This was approximately 6 weeks ago and is improved with reduction of lateral hip pain and continued presence of groin irritation    She has a year and a half old grandchild at this point and is moving from her house in [place] to [place]    She is not taking any pain medications nor does she wish to have any    Previously:  Patient returns for their fourth postoperative evaluation status post left total hip replacement. The patient is doing very well. They have minimal complaints of pain. improved gait and posture. Rates her pain as a 1 out of 10 and she is doing very well still has mild weakness in the quadriceps of the left leg and hip flexors left leg but they are getting better she is increasing her activity and resuming her exercises that she has stopped since completed physical therapy previously. There is minimal swelling about the Hip. The patient has been doing home physical therapy. They deny any calf swelling or numbness or tingling down the leg     Assessment   PHYSICAL EXAM   Vital Signs:    Vitals:    03/10/20 1111   BP: 133/66   Pulse: 88       Examination of the hip shows a well-healed incision distally . no signs of infection      There is mild swelling. There is no drainage. Range of motion is up to 110. There is no calf tenderness. The patient is neurovascularly intact. No signs of DVT. Calf nontender    Gross distal sensation is decreased in the lateral thigh from the area of the incision as expected.      Pain with iliopsoas testing still difficulty with flexion against resistance as well as external rotation against resistance consistent with iliopsoas irritation    RADIOLOGY   X-Rays reviewed: 2 views of the left hip AP and lateral x-rays of the hip performed and reviewed today and show excellent alignment of the total hip prosthesis. There is no loosening no fractures noted. Small area of bone island is noted at the left hip but is consistent with previous x-rays and actually is decreased in size compared to previous x-rays. No loosening or changes even given her history of recent pain       IMPRESSION   6 months plus status post left total hip replacement    PLAN   The patient is doing well 6 months plus status post left total hip replacement. We will continue home physical therapy for aggressive range of motion and strengthening. Her current pain is relative to continued weakness in iliopsoas with some irritability over the tendon and the muscle group. Efforts at improving the strength and single-leg balance were described.   We have also recommended additional physical therapy as she chooses fit based on her progression over the next several weeks time

## 2020-08-25 ENCOUNTER — OFFICE VISIT (OUTPATIENT)
Dept: ORTHOPEDIC SURGERY | Age: 70
End: 2020-08-25
Payer: MEDICARE

## 2020-08-25 VITALS — BODY MASS INDEX: 33.33 KG/M2 | WEIGHT: 225 LBS | HEIGHT: 69 IN

## 2020-08-25 PROBLEM — M19.012 PRIMARY OSTEOARTHRITIS, LEFT SHOULDER: Status: ACTIVE | Noted: 2017-03-30

## 2020-08-25 PROBLEM — M79.7 FIBROMYOSITIS: Status: ACTIVE | Noted: 2019-04-17

## 2020-08-25 PROBLEM — E66.3 OVERWEIGHT: Status: ACTIVE | Noted: 2019-04-18

## 2020-08-25 PROBLEM — M54.16 LUMBAR RADICULOPATHY: Status: ACTIVE | Noted: 2019-04-17

## 2020-08-25 PROBLEM — M47.816 DEGENERATIVE ARTHRITIS OF LUMBAR SPINE: Status: ACTIVE | Noted: 2019-04-17

## 2020-08-25 PROBLEM — M48.061 SPINAL STENOSIS OF LUMBAR REGION: Status: ACTIVE | Noted: 2019-04-17

## 2020-08-25 PROBLEM — F39 MOOD DISORDER (HCC): Status: ACTIVE | Noted: 2020-02-10

## 2020-08-25 PROBLEM — F90.0 ATTENTION DEFICIT HYPERACTIVITY DISORDER (ADHD), PREDOMINANTLY INATTENTIVE TYPE: Status: ACTIVE | Noted: 2020-02-10

## 2020-08-25 PROCEDURE — 99213 OFFICE O/P EST LOW 20 MIN: CPT | Performed by: ORTHOPAEDIC SURGERY

## 2020-08-25 PROCEDURE — G8427 DOCREV CUR MEDS BY ELIG CLIN: HCPCS | Performed by: ORTHOPAEDIC SURGERY

## 2020-08-25 PROCEDURE — 4040F PNEUMOC VAC/ADMIN/RCVD: CPT | Performed by: ORTHOPAEDIC SURGERY

## 2020-08-25 PROCEDURE — 20610 DRAIN/INJ JOINT/BURSA W/O US: CPT | Performed by: ORTHOPAEDIC SURGERY

## 2020-08-25 PROCEDURE — G8417 CALC BMI ABV UP PARAM F/U: HCPCS | Performed by: ORTHOPAEDIC SURGERY

## 2020-08-25 PROCEDURE — 1123F ACP DISCUSS/DSCN MKR DOCD: CPT | Performed by: ORTHOPAEDIC SURGERY

## 2020-08-25 PROCEDURE — G8400 PT W/DXA NO RESULTS DOC: HCPCS | Performed by: ORTHOPAEDIC SURGERY

## 2020-08-25 PROCEDURE — 3017F COLORECTAL CA SCREEN DOC REV: CPT | Performed by: ORTHOPAEDIC SURGERY

## 2020-08-25 PROCEDURE — 1036F TOBACCO NON-USER: CPT | Performed by: ORTHOPAEDIC SURGERY

## 2020-08-25 PROCEDURE — 1090F PRES/ABSN URINE INCON ASSESS: CPT | Performed by: ORTHOPAEDIC SURGERY

## 2020-08-25 RX ORDER — MELOXICAM 15 MG/1
15 TABLET ORAL DAILY PRN
Qty: 30 TABLET | Refills: 5 | Status: SHIPPED | OUTPATIENT
Start: 2020-08-25 | End: 2021-04-29

## 2020-08-25 RX ORDER — METHYLPREDNISOLONE ACETATE 40 MG/ML
80 INJECTION, SUSPENSION INTRA-ARTICULAR; INTRALESIONAL; INTRAMUSCULAR; SOFT TISSUE ONCE
Status: COMPLETED | OUTPATIENT
Start: 2020-08-25 | End: 2020-08-25

## 2020-08-25 RX ORDER — LIDOCAINE HYDROCHLORIDE 10 MG/ML
8 INJECTION, SOLUTION INFILTRATION; PERINEURAL ONCE
Status: COMPLETED | OUTPATIENT
Start: 2020-08-25 | End: 2020-08-25

## 2020-08-25 RX ADMIN — METHYLPREDNISOLONE ACETATE 80 MG: 40 INJECTION, SUSPENSION INTRA-ARTICULAR; INTRALESIONAL; INTRAMUSCULAR; SOFT TISSUE at 13:29

## 2020-08-25 RX ADMIN — LIDOCAINE HYDROCHLORIDE 8 ML: 10 INJECTION, SOLUTION INFILTRATION; PERINEURAL at 13:28

## 2020-08-25 NOTE — LETTER
Shoulder Elbow Rehabilitation Referral    Patient Name: Tristen Craven      YOB: 1950    Diagnosis: Right glenohumeral osteoarthritis  Precautions: none  Date of Prescription: 8/25/20    [x] Evaluate and Treat    Post Op Instructions:  [] Continuous passive motion (CPM)  [] Elbow range of motion  [] Exercise in plane of scapula   []  Strengthening     [] Pulley and instruction    [] Home exercise program (copy to patient)   [] Sling when arm at risk  [] Sling or brace at all times   [] AAROM: Forward elevation to            [] AAROM: External rotation  To      [] Isometric external rotator strengthening [] AAROM: internal rotation: up the back  [] Isometric abductor strengthening  [] AAROM: Internal abduction     [] Isometric internal rotator strengthening [] AAROM: cross-body adduction             Stretching:     Strengthening:  [x] Four quadrant (FE, ER, IR, CBA)  [x] Sleeper stretch    [] Rotator cuff (ER, IR, Abd)  [x] Forward Elevation    [] External Rotators     [x] External Rotation    [] Internal Rotators  [x] Internal Rotation: up/back   [] Abductors     [x] Internal Rotation: supine in abduction  [] Flexors  [] Cross-body abduction    [] Extensors  [] Pendulum (FE, Abd/Add, cw/ccw)  [] Scapular Stabilizers   [] Wall-walking (FE, Abd)    [] Shoulder shrugs     [] Table slides      [] Rhomboid pinch  [] Elbow (flex, ext, pron, sup)    [] Lat.  Pull downs     [] Medial epicondylitis program    [] Forward punch   [] Lateral epicondylitis program    [] Internal rotators     [] Progressive resistive exercises  [] Bench Press        [] Bench press plus  Activities:     [] Lateral pull-downs  [] Rowing     [] Progressive two-hand supine press  [] Stepper/Exercise bike   [] Biceps: curls/supination  [] Swimming  [] Water exercises    Modalities: PRN    Return to Sport:  [] Ultrasound     [] Plyometrics  [] Iontophoresis     [] Rhythmic stabilization  [] Moist heat     [] Core strengthening [] Massage     [] Sports specific program:     [] Cryotherapy      [] Electrical stimulation     [] Paraffin  [] Whirlpool  [] TENS    [x] Home exercise program (copy to patient).    Perform exercises for:  15     minutes    2-3      times/day  [x] Supervised physical therapy  Frequency: [x]  1x week  [] 2x week  [] 3x week  [] Other:   Duration: [] 2 weeks   [] 4 weeks  [x] 6 weeks  [] Other:     Additional Instructions:           Chapito Noriega MD  Orthopaedic Fellow  [address] [place] [address]

## 2020-08-25 NOTE — PROGRESS NOTES
times daily as needed (muscle spasm), Disp: 40 tablet, Rfl: 0    Prenatal Vit-Fe Fumarate-FA (PRENATAL VITAMIN PLUS LOW IRON) 27-1 MG TABS, TAKE 1 TABLET BY MOUTH TWICE A DAY, Disp: 60 tablet, Rfl: 1    omeprazole (PRILOSEC) 20 MG delayed release capsule, Take 20 mg by mouth daily, Disp: , Rfl:     atorvastatin (LIPITOR) 20 MG tablet, Take 20 mg by mouth, Disp: , Rfl:     Cholecalciferol (VITAMIN D3) 2000 units CAPS, Take 2,000 Units by mouth, Disp: , Rfl:     fluticasone (FLONASE) 50 MCG/ACT nasal spray, 2 sprays by Nasal route, Disp: , Rfl:     metFORMIN (GLUCOPHAGE-XR) 500 MG extended release tablet, Take 1,000 mg by mouth, Disp: , Rfl:     loratadine (CLARITIN) 10 MG capsule, Take 10 mg by mouth daily, Disp: , Rfl:     FLUoxetine (PROZAC) 20 MG capsule, Take 40 mg by mouth daily , Disp: , Rfl:     lisdexamfetamine (VYVANSE) 50 MG capsule, Take 50 mg by mouth every morning. ., Disp: , Rfl:     ARIPiprazole (ABILIFY) 2 MG tablet, Take 2 mg by mouth daily, Disp: , Rfl:     terbinafine (LAMISIL) 250 MG tablet, Take 250 mg by mouth daily, Disp: , Rfl:     levothyroxine (SYNTHROID) 50 MCG tablet, Take 50 mcg by mouth Daily, Disp: , Rfl:       No Known Allergies      Review of Systems: A 14 point review of systems was completed by the patient on 8/25/20 and is available in the media section of the scanned medical record and was reviewed today  The review is negative with the exception of those things mentioned in the HPI    No notes on file    Physical Exam:  Ht 5' 9\" (1.753 m)   Wt 225 lb (102.1 kg)   BMI 33.23 kg/m²       General: No acute distress, well nourished  CV: No obvious peripheral edema. Normal peripheral pulses  Neuro: Alert & oriented x 3  Psych: Appropriate mood and affect    Right Shoulder Exam:  Inspection: No gross deformities, no signs of infection. Palpation: No tenderness to palpation  Active Range of Motion:   Forward Elevation 165, Abduction 165, External Rotation 20, Internal Plan:   -Provided prescription for meloxicam and write information for over-the-counter Voltaren topical  -Provided prescription for physical therapy to work on stretching  - intra-articular steroid injection provided today  - Return in 6-8 wks for re-evaluation       Pertinent imaging was reviewed. The etiology, natural history, and treatment options for the disorder were discussed. The roles of activity modifications, medications, injections, physical therapy, and surgical interventions were all described to the patient and questions were answered. Procedure: Right Shoulder intra-articular injection  After discussion of the risks, benefits, alternatives to injection, patient agreeable. The skin was cleansed and prepped. Using a 25 gauge needle an injection with 2 mL of 40 mg/ml Depo-Medrol and 8 mL of 1 % lidocaine was injected without difficulty into the shoulder joint from a posterior approach. The needle was withdrawn and the puncture site sealed with a Band-Aid. The patient tolerated the procedure well. Mirella Boggs MD  Orthopaedic Fellow  [facility] and [address] [place] [address]      The encounter with Albania Guardado was supervised by Dr Christopher Acosta who personally examined the patient and reviewed the plan. This dictation was performed with a verbal recognition program (DRAGON) and it was checked for errors. It is possible that there are still dictated errors within this office note. If so, please bring any errors to my attention for an addendum. All efforts were made to ensure that this office note is accurate.   _______________  I was physically present and personally supervised the Orthopaedic Sports Medicine Fellow in the evaluation and development of a treatment plan for this patient. I personally interviewed the patient and performed a physical examination. In addition, I discussed the patient's condition and treatment options with them.  I have also reviewed and agree with the past medical, family and social history unless otherwise noted. All of the patient's questions were answered. Dinorah Mitchell MD, PhD  8/25/2020

## 2020-08-26 ENCOUNTER — TELEPHONE (OUTPATIENT)
Dept: ORTHOPEDIC SURGERY | Age: 70
End: 2020-08-26

## 2020-08-26 NOTE — TELEPHONE ENCOUNTER
PATIENT STATES DR BORJAS REFERRED HER TO PHYSICAL THERAPY OFFICE @ [facility], HOWEVER; SHE STATES THEIR OFFICE IS CLOSED. SHE WOULD LIKE ANOTHER RECOMMENDATION FOR ANOTHER OFFICE. STATES [facility] THE [facility]ST.   Trey Chakraborty [phone]

## 2020-09-03 ENCOUNTER — HOSPITAL ENCOUNTER (OUTPATIENT)
Dept: PHYSICAL THERAPY | Age: 70
Setting detail: THERAPIES SERIES
Discharge: HOME | End: 2020-09-03
Payer: MEDICARE

## 2020-09-03 PROCEDURE — 97110 THERAPEUTIC EXERCISES: CPT | Performed by: PHYSICAL THERAPIST

## 2020-09-03 PROCEDURE — 97161 PT EVAL LOW COMPLEX 20 MIN: CPT | Performed by: PHYSICAL THERAPIST

## 2020-09-03 PROCEDURE — 97140 MANUAL THERAPY 1/> REGIONS: CPT | Performed by: PHYSICAL THERAPIST

## 2020-09-06 NOTE — PLAN OF CARE
The [facility] and [facility], [facility]  [address], [place], [facility]  Phone: [phone]   Fax:     [phone]                                                       Physical Therapy Certification    Dear Referring Practitioner: dr Arnel Kelley,    We had the pleasure of evaluating the following patient for physical therapy services at [address] [place] [address]. A summary of our findings can be found in the initial assessment below. This includes our plan of care. If you have any questions or concerns regarding these findings, please do not hesitate to contact me at the office phone number checked above. Thank you for the referral.       Physician Signature:_______________________________Date:__________________  By signing above (or electronic signature), therapists plan is approved by physician      Patient:  Ming Bo   : 1950   MRN: [id number]  Referring Physician: Referring Practitioner: dr Arnel Kelley      Evaluation Date: 9/3/2020      Medical Diagnosis Information:  Diagnosis: P09.689 (ICD-10-CM) - Right shoulder pain, unspecified chronicity   Treatment Diagnosis: M25.511 (ICD-10-CM) - Right shoulder pain, unspecified chronicity                                         Insurance information:      Precautions/ Contra-indications:    Latex Allergy:  [x]NO      []YES  Preferred Language for Healthcare:   [x]English       []Other:    C-SSRS Triggered by Intake questionnaire (Past 2 wk assessment):   [x] No, Questionnaire did not trigger screening.   [] Yes, Patient intake triggered further evaluation      [] C-SSRS Screening completed  [] PCP notified via Plan of Care  [] Emergency services notified      SUBJECTIVE: Patient stated complaint:  Reports that she has been having incidiously progressive pain     Relevant Medical History:   thyroid   Dm  oa    Functional Disability Index:  46% uefi   Pain Scale: 5/10  Easing factors: rest  Provocative factors: Movement/activity     Night Pain:  - complained of night pain associated with sleep position. Type:     - Intermittent      Paresthesia complaints:   - no paresthesia complaints     Functional Limitations/Impairments: []  - Lifting  - reaching   - Grooming   - Carrying      - ADL's   - Driving  - Sports/Recreation activity      Occupation/School:   -- sedentary occupation       Living Status/Prior Level of Function: This patient was independent in ADL's and IADL's prior to onset of symptoms. PACEMAKER:  - Denied having a pacemaker that would contraindicate the use of electrical modalities. METAL IMPLANTS:  - Denied metal implants that would contraindicate the use of thermal modalities. CANCER HISTORY:  - Denied a history of cancer that would contraindicate thermal modalities. OBJECTIVE:     ROM:   CERVICAL    SHOULDER: decreased arom 40% uefi     Joint Mobility:     Strength/Neuromuscular control:  3-/5 shoulder pain     Dermatomal Sensation:  - Dermatomal sensation was intact to light touch bilaterally throughout upper and lower extremities. Deep tendon reflexes:  - DTR's were symmetrical and intact throughout. Palpation:    Functional Mobility/Transfers:     Posture: + fwd head/kyphosis       Bandages/Dressings/Incisions:     Gait:     Orthopedic Special Tests:                           [x] Patient history, allergies, meds reviewed. Medical chart reviewed. See intake form. Review Of Systems (ROS):  [x]Performed Review of systems (Integumentary, CardioPulmonary, Neurological) by intake and observation. Intake form has been scanned into medical record. Patient has been instructed to contact their primary care physician regarding ROS issues if not already being addressed at this time.       Objective Review of Systems:  Cognitive, Communication, Behavior and Learning:  - The patient was alert, spoke coherently and was oriented to person, place and more elements   [] a total of 3 or more elements   [] a total of 4 or more elements   [x] A clinical presentation with:  [] stable and/or uncomplicated characteristics   [x] evolving clinical presentation with changing characteristics  [] unstable and unpredictable characteristics;   [x] Clinical decision making of [x] low, [] moderate, [] high complexity using standardized patient assessment instrument and/or measurable assessment of functional outcome. [x] EVAL (LOW) 78281 (typically 30 minutes face-to-face)  [] EVAL (MOD) 97031 (typically 30 minutes face-to-face)  [] EVAL (HIGH) 72534 (typically 45 minutes face-to-face)  [] RE-EVAL         Co-morbidities/Complexities (which will affect course of rehabilitation):  []None           Arthritic conditions   []Rheumatoid arthritis (M05.9)  [x]Osteoarthritis (M19.91)   Cardiovascular conditions   []Hypertension (I10)  []Hyperlipidemia (E78.5)  []Angina pectoris (I20)  []Atherosclerosis (I70)   Musculoskeletal conditions   []Disc pathology   []Congenital spine pathologies   []Prior surgical intervention  []Osteoporosis (M81.8)  []Osteopenia (M85.8)   Endocrine conditions   []Hypothyroid (E03.9)  []Hyperthyroid Gastrointestinal conditions   []Constipation (V70.72)   Metabolic conditions   []Morbid obesity (E66.01)  [x]Diabetes type 1(E10.65) or 2 (E11.65)   []Neuropathy (G60.9)     Pulmonary conditions   []Asthma (J45)  []Coughing   []COPD (J44.9)   Psychological Disorders  []Anxiety (F41.9)  []Depression (F32.9)   []Other:   [x]Other:       Thyroid          PLAN:  Frequency/Duration:  2 days per week for 8  Weeks:  INTERVENTIONS:  1. Therapeutic exercise including: strength training, ROM, NMR and proprioception for the scapula, core and Upper extremity  2. Manual therapy as indicated including Dry Needling/IASTM, STM, PROM, Gr I-IV mobilizations, spinal mobilization/manipulation.    3. Modalities as needed including: thermal agents, E-stim, US, iontophoresis as indicated. 4. Patient education on joint protection, activity modification, progression of HEP. HEP instruction: wall stretch, cane er @ supine, scap pinches     GOALS:  Patient stated goal:      Therapist goals for Patient:   Short Term Goals: To be achieved in: 2 weeks  - Independent in HEP and progression per patient tolerance, in order to prevent re-injury. -  Patient will have a decrease in pain to facilitate improvement in movement, function, and ADLs as indicated by Functional Deficits. Long Term Goals: To be achieved in: 8  weeks  - The patient is expected to demonstrate less than  16% impairment, limitation or restriction in:  - - carrying, moving and handling objects. - Patient will demonstrate increased passive and active ROM to full to allow for proper joint functioning as indicated by patients Functional Deficits. - Patient will demonstrate an increase in Strength to 4/5  to allow for proper functional mobility as indicated by patients Functional Deficits. - Patient will return to desired, higher level upper extremity functional activities without increased symptoms or restriction.       Electronically signed by:  Fabrice Chan PT, MPT

## 2020-09-06 NOTE — PLAN OF CARE
The [facility] and [facility]   [address],  [address], [address] [place] [address]   Phone: [phone] Fax: [phone]       Physical Therapy Certification   Dear Referring Practitioner: dr Anupama Tejeda,   We had the pleasure of evaluating the following patient for physical therapy services at [address] [place] [address]. A summary of our findings can be found in the initial assessment below. This includes our plan of care. If you have any questions or concerns regarding these findings, please do not hesitate to contact me at the office phone number checked above. Thank you for the referral.   Physician Signature:_______________________________Date:__________________   By signing above (or electronic signature), therapists plan is approved by physician   Patient: Dona Lopez : 1950 MRN: [id number]   Referring Physician: Referring Practitioner: dr Anupama Tejeda   Evaluation Date: 9/3/2020   Medical Diagnosis Information:   Diagnosis: C60.017 (ICD-10-CM) - Right shoulder pain, unspecified chronicity   Treatment Diagnosis: M25.511 (ICD-10-CM) - Right shoulder pain, unspecified chronicity   Insurance information:   Precautions/ Contra-indications:   Latex Allergy: ?NO ? YES   Preferred Language for Healthcare: ?English ? Other:   C-SSRS Triggered by Intake questionnaire (Past 2 wk assessment):   ? No, Questionnaire did not trigger screening. ? Yes, Patient intake triggered further evaluation   ? C-SSRS Screening completed   ? PCP notified via Plan of Care   ? Emergency services notified   SUBJECTIVE: Patient stated complaint: Reports that she has been having incidiously progressive pain   Relevant Medical History:   thyroid   Dm   oa   Functional Disability Index: 46% uefi   Pain Scale: 5/10   Easing factors:   rest   Provocative factors: Movement/activity   Night Pain:   - complained of night pain associated with sleep position.    Type:   - Intermittent or educational needs. Cardiopulmonary:   Edema:   Integumentary:   Nail beds:   Skin appearance:   MEDICARE CAP EXCEPTION DOCUMENTATION   I certify that this patient meets one of the below criteria necessary for becoming an exception to the Medicare cap on therapy services:   ? The patient has a condition that has a direct and significant impact on the need for therapy. (Significantly impacts the rate of recovery)   ? The patient has a complexity that has a direct and significant impact on the need for therapy. (Significantly impacts the rate of recovery and is associated with a primary condition.)   ? The patient has associated variables that influence the amount of treatment to include: Social support, self-efficacy/motivation, prognosis, time since onset/acuity. ? The patient has generalized musculoskeletal conditions or a condition affecting multiple sites that will have a direct impact on the rate of recovery. ? The patient had a prior episode of outpatient therapy during this calendar year for a different condition. ? The patient has a mental or cognitive disorder in addition to the condition being treated that will have a direct and significant impact on the rate of recovery. Falls Risk Assessment (30 days):   ? Falls Risk assessed and no intervention required. ? Falls Risk assessed and Patient requires intervention due to being higher risk   TUG score (>12s at risk):   ? Falls education provided, including   :   ASSESSMENT:   Functional Impairments   ? Noted spinal or UE joint hypomobility   ? Noted spinal or UE joint hypermobility   ? Decreased UE functional ROM   ? Decreased UE functional strength   ? Abnormal reflexes/sensation/myotomal/dermatomal deficits   ? Decreased RC/scapular/core strength and neuromuscular control   ?other:   Functional Activity Limitations (from functional questionnaire and intake)   ? Reduced ability to tolerate prolonged functional positions   ? Reduced ability or difficulty with changes of positions or transfers between positions   ? Reduced ability to maintain good posture and demonstrate good body mechanics with sitting, bending, and lifting   ? Reduced ability or tolerance with driving and/or computer work   ? Reduced ability to sleep   ? Reduced ability to perform lifting, reaching, carrying tasks   ? Reduced ability to tolerate impact through UE   ? Reduced ability to reach behind back   ? Reduced ability to  or hold objects   ? Reduced ability to throw or toss an object         Participation Restrictions   ? Reduced participation in self care activities   ? Reduced participation in home management activities   ? Reduced participation in work activities   ? Reduced participation in social activities. ?Reduced participation in sport activities. Classification :   - ligament or other connective tissue dysfunction. (Pattern 4D)   - localized inflammation. (Pattern 4E)   -   Prognosis/Rehab Potential:   - Fair   Factors affecting rehab potential:   - associated co-morbidities   Tolerance of evaluation/treatment:   - Good   Physical Therapy Evaluation Complexity Justification   ? A history of present problem with:   ? no personal factors and/or comorbidities that impact the plan of care;   ?? An examination of body systems using standardized tests and measures addressing any of the following: body structures and functions (impairments), activity limitations, and/or participation restrictions;:   ? a total of 1-2 or more elements   ? ? A clinical presentation with:   ?  ? evolving clinical presentation with changing characteristics   ? ? Clinical decision making of ? low, ? moderate, ? high complexity using standardized patient assessment instrument and/or measurable assessment of functional outcome. ? EVAL (LOW) 66169 (typically 30 minutes face-to-face)   ? Co-morbidities/Complexities (which will affect course of rehabilitation):    ? None      Arthritic conditions   ? Rheumatoid arthritis (M05.9)   ? Osteoarthritis (M19.91)  Cardiovascular conditions   ? Hypertension (I10)   ? Hyperlipidemia (E78.5)   ? Angina pectoris (I20)   ? Atherosclerosis (I70)  Musculoskeletal conditions   ? Disc pathology   ? Congenital spine pathologies   ? Prior surgical intervention   ? Osteoporosis (M81.8)   ? Osteopenia (M85.8)   Endocrine conditions   ? Hypothyroid (E03.9)   ? Hyperthyroid Gastrointestinal conditions   ? Constipation (R77.38)  Metabolic conditions   ? Morbid obesity (E66.01)   ? Diabetes type 1(E10.65) or 2 (E11.65)   ? Neuropathy (G60.9)    Pulmonary conditions   ? Asthma (J45)   ? Coughing   ? COPD (J44.9)  Psychological Disorders   ? Anxiety (F41.9)   ? Depression (F32.9)   ? Other:  ?Other:   Thyroid    PLAN:   Frequency/Duration: 2 days per week for 8 Weeks:   INTERVENTIONS:   1. Therapeutic exercise including: strength training, ROM, NMR and proprioception for the scapula, core and Upper extremity   2. Manual therapy as indicated including Dry Needling/IASTM, STM, PROM, Gr I-IV mobilizations, spinal mobilization/manipulation. 3. Modalities as needed including: thermal agents, E-stim, US, iontophoresis as indicated. 4. Patient education on joint protection, activity modification, progression of HEP. HEP instruction: wall stretch, cane er @ supine, scap pinches   GOALS:   Patient stated goal:   Therapist goals for Patient:   Short Term Goals: To be achieved in: 2 weeks   - Independent in HEP and progression per patient tolerance, in order to prevent re-injury. - Patient will have a decrease in pain to facilitate improvement in movement, function, and ADLs as indicated by Functional Deficits. Long Term Goals: To be achieved in: 8 weeks   - The patient is expected to demonstrate less than 16% impairment, limitation or restriction in:   - - carrying, moving and handling objects.    - Patient will demonstrate increased passive and active ROM to full to allow for proper joint functioning as indicated by patients Functional Deficits. - Patient will demonstrate an increase in Strength to 4/5 to allow for proper functional mobility as indicated by patients Functional Deficits. - Patient will return to desired, higher level upper extremity functional activities without increased symptoms or restriction. Electronically signed by: Rosette Tubbs PT, MPT                   Additional Documentation   Flowsheets:  General,   Summary      Encounter Info:  Billing Info,   Allergies,   Detailed Report,   History,   Medications,   Questionnaires                                    Chart Review Routing History   No routing history on file.       Encounter Status   Open

## 2020-09-06 NOTE — FLOWSHEET NOTE
care, mobility, lifting, ambulation. [x] (30071) Provided verbal/tactile cueing for activities related to improving balance, coordination, kinesthetic sense, posture, motor skill, proprioception to assist with LE, proximal hip, and core control in self-care, mobility, lifting, ambulation and eccentric single leg control. NMR and Therapeutic Activities:    [x] (87790 or 75497) Provided verbal/tactile cueing for activities related to improving balance, coordination, kinesthetic sense, posture, motor skill, proprioception and motor activation to allow for proper function of core, proximal hip and LE with self-care and ADLs and functional mobility.   [] (63446) Gait Re-education- Provided training and instruction to the patient for proper LE, core and proximal hip recruitment and positioning and eccentric body weight control with ambulation re-education including up and down stairs     Home Exercise Program:    [x] (51334) Reviewed/Progressed HEP activities related to strengthening, flexibility, endurance, ROM of core, proximal hip and LE for functional self-care, mobility, lifting and ambulation/stair navigation   [] (68725) Reviewed/Progressed HEP activities related to improving balance, coordination, kinesthetic sense, posture, motor skill, proprioception of core, proximal hip and LE for self-care, mobility, lifting, and ambulation/stair navigation      Manual Treatments:  PROM / STM / Oscillations-Mobs:  G-I, II, III, IV (PA's, Inf., Post.)  [x] (25314) Provided manual therapy to mobilize LE, proximal hip and/or LS spine soft tissue/joints for the purpose of modulating pain, promoting relaxation, increasing ROM, reducing/eliminating soft tissue swelling/inflammation/restriction, improving soft tissue extensibility and allowing for proper ROM for normal function with self-care, mobility, lifting and ambulation. Modalities:     [] GAME READY (VASO)- for significant edema, swelling, pain control. Charges:  Timed Code Treatment Minutes: 35'    Total Treatment Minutes: 76'       [x] EVAL (LOW) 76917 (typically 20 minutes face-to-face)  [] EVAL (MOD) 02419 (typically 30 minutes face-to-face)  [] EVAL (HIGH) 75747 (typically 45 minutes face-to-face)  [] RE-EVAL     [x] SJ(18940) x    1 [] IONTO  [] NMR (76690) x     [] VASO  [x] Manual (15474) x    1 [] Other:  [] TA x      [] Mech Traction (44183)  [] ES(attended) (55534)      [] ES (un) (35859):         ASSESSMENT:  See eval      GOALS:   Patient stated goal:    [] Progressing: [] Met: [] Not Met: [] Adjusted    Therapist goals for Patient:   Short Term Goals: To be achieved in: 2 weeks  1. Independent in HEP and progression per patient tolerance, in order to prevent re-injury. [] Progressing: [] Met: [] Not Met: [] Adjusted   2. Patient will have a decrease in pain to facilitate improvement in movement, function, and ADLs as indicated by Functional Deficits. [] Progressing: [] Met: [] Not Met: [] Adjusted    Long Term Goals: To be achieved in:       Overall Progression Towards Functional goals/ Treatment Progress Update:  [] Patient is progressing as expected towards functional goals listed. [] Progression is slowed due to complexities/Impairments listed. [] Progression has been slowed due to co-morbidities.   [x] Plan just implemented, too soon to assess goals progression <30days   [] Goals require adjustment due to lack of progress  [] Patient is not progressing as expected and requires additional follow up with physician  [] Other    Prognosis for POC: [x] Good [] Fair  [] Poor      Patient requires continued skilled intervention: [x] Yes  [] No    Treatment/Activity Tolerance:  [x] Patient able to complete treatment  [] Patient limited by fatigue  [] Patient limited by pain    [] Patient limited by other medical complications  [] Other:         Return to Play: (if applicable)   []  Stage 1: Intro to Strength   []  Stage 2: Return to Run and Strength   []  Stage 3: Return to Jump and Strength   []  Stage 4: Dynamic Strength and Agility   []  Stage 5: Sport Specific Training     []  Ready to Return to Play, Meets All Above Stages   []  Not Ready for Return to Sports   Comments:                               PLAN: See eval  [] Continue per plan of care [] Alter current plan (see comments above)  [x] Plan of care initiated [] Hold pending MD visit [] Discharge      Electronically signed by:  Carlyn Garland, PT, MPT     Note: If patient does not return for scheduled/ recommended follow up visits, this note will serve as a discharge from care along with most recent update on progress.

## 2020-09-06 NOTE — FLOWSHEET NOTE
[address] [facility]       Physical Therapy Treatment Note/ Progress Report:           Date:  9/3/2020  Patient Name:  Antoinette Powers    :  1950  MRN: [id number]  Restrictions/Precautions:    Medical/Treatment Diagnosis Information:  · Diagnosis: M25.511 (ICD-10-CM) - Right shoulder pain, unspecified chronicity  · Treatment Diagnosis: M25.511 (ICD-10-CM) - Right shoulder pain, unspecified chronicity  Insurance/Certification information:     Physician Information:  Referring Practitioner: dr Longoria Organ  Has the plan of care been signed (Y/N):        []  Yes  [x]  No     Date of Patient follow up with Physician:        Is this a Progress Report:     []  Yes  [x]  No        If Yes:  Date Range for reporting period:  Beginning   Ending     Progress report will be due (10 Rx or 30 days whichever is less):        Recertification will be due (POC Duration  / 90 days whichever is less):          Visit # Insurance Allowable Auth Required   1  []  Yes []  No        Functional Scale:     Date assessed:        Latex Allergy:  [x]NO      []YES  Preferred Language for Healthcare:   [x]English       []other:      Pain level:  7/10     SUBJECTIVE:  See eval    OBJECTIVE: See eval   Observation:    Test measurements:      RESTRICTIONS/PRECAUTIONS:      Exercises/Interventions:       Therapeutic Ex (67720) Sets/sec Reps Notes/CUES   Wall stretch   5 x 10\"     Supine cane er @ 45 degrees   6 x 20\"    Pinches   25x                            Education   7'                             Manual Intervention (65684)      Prom/stm   15'                                                                                                                                             Therapeutic Exercise and NMR EXR  [x] (37163) Provided verbal/tactile cueing for activities related to strengthening, flexibility, endurance, ROM for improvements in LE, proximal hip, and core control with self care, mobility, lifting, ambulation. [x] (37744) Provided verbal/tactile cueing for activities related to improving balance, coordination, kinesthetic sense, posture, motor skill, proprioception to assist with LE, proximal hip, and core control in self-care, mobility, lifting, ambulation and eccentric single leg control. NMR and Therapeutic Activities:    [x] (73271 or 63749) Provided verbal/tactile cueing for activities related to improving balance, coordination, kinesthetic sense, posture, motor skill, proprioception and motor activation to allow for proper function of core, proximal hip and LE with self-care and ADLs and functional mobility.   [] (13133) Gait Re-education- Provided training and instruction to the patient for proper LE, core and proximal hip recruitment and positioning and eccentric body weight control with ambulation re-education including up and down stairs     Home Exercise Program:    [x] (17401) Reviewed/Progressed HEP activities related to strengthening, flexibility, endurance, ROM of core, proximal hip and LE for functional self-care, mobility, lifting and ambulation/stair navigation   [] (55110) Reviewed/Progressed HEP activities related to improving balance, coordination, kinesthetic sense, posture, motor skill, proprioception of core, proximal hip and LE for self-care, mobility, lifting, and ambulation/stair navigation      Manual Treatments:  PROM / STM / Oscillations-Mobs:  G-I, II, III, IV (PA's, Inf., Post.)  [x] (25236) Provided manual therapy to mobilize LE, proximal hip and/or LS spine soft tissue/joints for the purpose of modulating pain, promoting relaxation, increasing ROM, reducing/eliminating soft tissue swelling/inflammation/restriction, improving soft tissue extensibility and allowing for proper ROM for normal function with self-care, mobility, lifting and ambulation. Modalities:     [] GAME READY (VASO)- for significant edema, swelling, pain control. Charges:  Timed Code Treatment Minutes: 35'    Total Treatment Minutes: 76'       [x] EVAL (LOW) 72427 (typically 20 minutes face-to-face)  [] EVAL (MOD) 17872 (typically 30 minutes face-to-face)  [] EVAL (HIGH) 40604 (typically 45 minutes face-to-face)  [] RE-EVAL     [x] QU(36922) x   1 [] IONTO  [] NMR (47497) x     [] VASO  [x] Manual (67549) x    1 [] Other:  [] TA x      [] Mech Traction (54399)  [] ES(attended) (45682)      [] ES (un) (19418):         ASSESSMENT:  See eval      GOALS:   Patient stated goal:     [] Progressing: [] Met: [] Not Met: [] Adjusted    Therapist goals for Patient:   Short Term Goals: To be achieved in: 2 weeks  1. Independent in HEP and progression per patient tolerance, in order to prevent re-injury. [] Progressing: [] Met: [] Not Met: [] Adjusted   2. Patient will have a decrease in pain to facilitate improvement in movement, function, and ADLs as indicated by Functional Deficits. [] Progressing: [] Met: [] Not Met: [] Adjusted    Long Term Goals: To be achieved in:    Long Term Goals: To be achieved in: 8 weeks   - The patient is expected to demonstrate less than 16% impairment, limitation or restriction in:   - - carrying, moving and handling objects. - Patient will demonstrate increased passive and active ROM to full to allow for proper joint functioning as indicated by patients Functional Deficits. [] Progressing: [] Met: [] Not Met: [] Adjusted  - Patient will demonstrate an increase in Strength to 4/5 to allow for proper functional mobility as indicated by patients Functional Deficits. [] Progressing: [] Met: [] Not Met: [] Adjusted  - Patient will return to desired, higher level upper extremity functional activities without increased symptoms or restriction.    [] Progressing: [] Met: [] Not Met: [] Adjusted          Overall Progression Towards Functional goals/ Treatment Progress Update:  [] Patient is progressing as expected towards functional goals listed. [] Progression is slowed due to complexities/Impairments listed. [] Progression has been slowed due to co-morbidities. [x] Plan just implemented, too soon to assess goals progression <30days   [] Goals require adjustment due to lack of progress  [] Patient is not progressing as expected and requires additional follow up with physician  [] Other    Prognosis for POC: [x] Good [] Fair  [] Poor      Patient requires continued skilled intervention: [x] Yes  [] No    Treatment/Activity Tolerance:  [x] Patient able to complete treatment  [] Patient limited by fatigue  [] Patient limited by pain    [] Patient limited by other medical complications  [] Other:         Return to Play: (if applicable)   []  Stage 1: Intro to Strength   []  Stage 2: Return to Run and Strength   []  Stage 3: Return to Jump and Strength   []  Stage 4: Dynamic Strength and Agility   []  Stage 5: Sport Specific Training     []  Ready to Return to Play, Meets All Above Stages   []  Not Ready for Return to Sports   Comments:                               PLAN: See eval  [] Continue per plan of care [] Alter current plan (see comments above)  [x] Plan of care initiated [] Hold pending MD visit [] Discharge      Electronically signed by:  Edgar Villalpando PT, MPT     Note: If patient does not return for scheduled/ recommended follow up visits, this note will serve as a discharge from care along with most recent update on progress.

## 2020-09-06 NOTE — PLAN OF CARE
Night Pain:  -  - complained of night pain associated with sleep position. - complained of waking night pain not associated with position. Type:   - Constant     - Intermittent      Paresthesia complaints:   - no paresthesia complaints   - Paresthesia complaints in a distribution. Functional Limitations/Impairments: []  - Lifting  - reaching   - Grooming   - Carrying      - ADL's   - Driving  - Sports/Recreation activity      Occupation/School:   - retired    - domestic    - labor intensive occupation   - sedentary occupation   - high school student    - college student   - unemployed   - on disability   - other      Living Status/Prior Level of Function: This patient was independent in ADL's and IADL's prior to onset of symptoms. PACEMAKER:  - Denied having a pacemaker that would contraindicate the use of electrical modalities. - Has a pacemaker that would make electrical modalities contraindicated. METAL IMPLANTS:  - Denied metal implants that would contraindicate the use of thermal modalities. - Has a metal implant in the treatment area that would make the use of thermal agents contraindicated. CANCER HISTORY:  - Denied a history of cancer that would contraindicate thermal modalities. - Has a history of cancer that would make the use of thermal modalities contraindicated. OBJECTIVE:     ROM:   CERVICAL    SHOULDER    Joint Mobility:     Strength/Neuromuscular control:    Dermatomal Sensation:  - Dermatomal sensation was intact to light touch bilaterally throughout upper and lower extremities. Deep tendon reflexes:  - DTR's were symmetrical and intact throughout. Palpation:    Functional Mobility/Transfers:     Posture:  + fwd head/kyphosis       Bandages/Dressings/Incisions:     Gait:     Orthopedic Special Tests:                           [x] Patient history, allergies, meds reviewed. Medical chart reviewed. See intake form.      Review Of Systems (ROS):  [x]Performed Review of systems (Integumentary, CardioPulmonary, Neurological) by intake and observation. Intake form has been scanned into medical record. Patient has been instructed to contact their primary care physician regarding ROS issues if not already being addressed at this time. Objective Review of Systems:  Cognitive, Communication, Behavior and Learning:  - The patient was alert, spoke coherently and was oriented to person, place and time. - Demonstrated no barriers to communication or processing information.  - Appeared literate, spoke [place] and had no significant hearing or vision impairment. - Had no abnormal behavioral responses, learning preferences or educational needs. Cardiopulmonary:  Edema:       Integumentary:  Nail beds:    Skin appearance:      MEDICARE CAP EXCEPTION DOCUMENTATION  I certify that this patient meets one of the below criteria necessary for becoming an exception to the Medicare cap on therapy services:  []  The patient has a condition that has a direct and significant impact on the need for therapy. (Significantly impacts the rate of recovery)  [x]  The patient has a complexity that has a direct and significant impact on the need for therapy. (Significantly impacts the rate of recovery and is associated with a primary condition.)       []  The patient has associated variables that influence the amount of treatment to include:  Social support, self-efficacy/motivation, prognosis, time since onset/acuity. []  The patient has generalized musculoskeletal conditions or a condition affecting multiple sites that will have a direct impact on the rate of recovery. []  The patient had a prior episode of outpatient therapy during this calendar year for a different condition. []  The patient has a mental or cognitive disorder in addition to the condition being treated that will have a direct and significant impact on the rate of recovery.     Falls Risk Assessment (30 days):   [] and/or comorbidities that impact the plan of care;  []1-2 personal factors and/or comorbidities that impact the plan of care  []3 personal factors and/or comorbidities that impact the plan of care  [x] An examination of body systems using standardized tests and measures addressing any of the following: body structures and functions (impairments), activity limitations, and/or participation restrictions;:  [x] a total of 1-2 or more elements   [] a total of 3 or more elements   [] a total of 4 or more elements   [x] A clinical presentation with:  [] stable and/or uncomplicated characteristics   [x] evolving clinical presentation with changing characteristics  [] unstable and unpredictable characteristics;   [x] Clinical decision making of [x] low, [] moderate, [] high complexity using standardized patient assessment instrument and/or measurable assessment of functional outcome.     [x] EVAL (LOW) 29785 (typically 30 minutes face-to-face)  [] EVAL (MOD) 08063 (typically 30 minutes face-to-face)  [] EVAL (HIGH) 74386 (typically 45 minutes face-to-face)  [] RE-EVAL         Co-morbidities/Complexities (which will affect course of rehabilitation):  []None           Arthritic conditions   []Rheumatoid arthritis (M05.9)  [x]Osteoarthritis (M19.91)   Cardiovascular conditions   []Hypertension (I10)  []Hyperlipidemia (E78.5)  []Angina pectoris (I20)  []Atherosclerosis (I70)   Musculoskeletal conditions   []Disc pathology   []Congenital spine pathologies   []Prior surgical intervention  []Osteoporosis (M81.8)  []Osteopenia (M85.8)   Endocrine conditions   []Hypothyroid (E03.9)  []Hyperthyroid Gastrointestinal conditions   []Constipation (N30.58)   Metabolic conditions   []Morbid obesity (E66.01)  [x]Diabetes type 1(E10.65) or 2 (E11.65)   []Neuropathy (G60.9)     Pulmonary conditions   []Asthma (J45)  []Coughing   []COPD (J44.9)   Psychological Disorders  []Anxiety (F41.9)  []Depression (F32.9)   []Other:   []Other: PLAN:  Frequency/Duration:  2 days per week for 8  Weeks:  INTERVENTIONS:  1. Therapeutic exercise including: strength training, ROM, NMR and proprioception for the scapula, core and Upper extremity  2. Manual therapy as indicated including Dry Needling/IASTM, STM, PROM, Gr I-IV mobilizations, spinal mobilization/manipulation. 3. Modalities as needed including: thermal agents, E-stim, US, iontophoresis as indicated. 4. Patient education on joint protection, activity modification, progression of HEP. HEP instruction:       GOALS:  Patient stated goal:      Therapist goals for Patient:   Short Term Goals: To be achieved in: 2 weeks  - Independent in HEP and progression per patient tolerance, in order to prevent re-injury. -  Patient will have a decrease in pain to facilitate improvement in movement, function, and ADLs as indicated by Functional Deficits. Long Term Goals: To be achieved in: 8   weeks  - The patient is expected to demonstrate less than  % impairment, limitation or restriction in:  -- carrying, moving and handling objects. - Patient will demonstrate increased passive and active ROM to full, symmetrical and painfree to allow for proper joint functioning as indicated by patients Functional Deficits. - Patient will demonstrate an increase in Strength to symmetrical and painfree to allow for proper functional mobility as indicated by patients Functional Deficits. - Patient will return to desired, higher level upper extremity functional activities without increased symptoms or restriction.       Electronically signed by:  Stepan Coffman PT, MPT

## 2020-09-09 ENCOUNTER — HOSPITAL ENCOUNTER (OUTPATIENT)
Dept: PHYSICAL THERAPY | Age: 70
Setting detail: THERAPIES SERIES
Discharge: HOME | End: 2020-09-09
Payer: MEDICARE

## 2020-09-09 PROCEDURE — 97140 MANUAL THERAPY 1/> REGIONS: CPT | Performed by: PHYSICAL THERAPIST

## 2020-09-09 PROCEDURE — 97110 THERAPEUTIC EXERCISES: CPT | Performed by: PHYSICAL THERAPIST

## 2020-09-16 ENCOUNTER — HOSPITAL ENCOUNTER (OUTPATIENT)
Dept: PHYSICAL THERAPY | Age: 70
Setting detail: THERAPIES SERIES
Discharge: HOME | End: 2020-09-16
Payer: MEDICARE

## 2020-09-16 PROCEDURE — 97140 MANUAL THERAPY 1/> REGIONS: CPT | Performed by: PHYSICAL THERAPIST

## 2020-09-16 PROCEDURE — 97110 THERAPEUTIC EXERCISES: CPT | Performed by: PHYSICAL THERAPIST

## 2020-09-17 NOTE — FLOWSHEET NOTE
[address] [facility]       Physical Therapy Treatment Note/ Progress Report:           Date:  2020  Patient Name:  Trevor Franklin    :  1950  MRN: [id number]  Restrictions/Precautions:    Medical/Treatment Diagnosis Information:  ·   Diagnosis: M25.511 (ICD-10-CM) - Right shoulder pain, unspecified chronicity  Treatment Diagnosis: M25.511 (ICD-10-CM) - Right shoulder pain, unspecified chronicity     Insurance/Certification information:     Physician Information:     Has the plan of care been signed (Y/N):        []  Yes  [x]  No     Date of Patient follow up with Physician:       Is this a Progress Report:     []  Yes  [x]  No        If Yes:  Date Range for reporting period:  Beginning   Ending     Progress report will be due (10 Rx or 30 days whichever is less):        Recertification will be due (POC Duration  / 90 days whichever is less):          Visit # Insurance Allowable Auth Required   2   []  Yes []  No        Functional Scale:     Date assessed:         Latex Allergy:  [x]NO      []YES  Preferred Language for Healthcare:   [x]English       []other:      Pain level:  7/10     SUBJECTIVE:  See eval    OBJECTIVE: See eval   Observation:    Test measurements:      RESTRICTIONS/PRECAUTIONS:    Exercises/Interventions:       Therapeutic Ex (47543) Sets/sec Reps Notes/CUES   Wall stretch   6 x 10\"     Supine er @ 45 degrees supine   5 x 20\"     Pinches   30x                                   Education   6'                       Manual Intervention (97787)      Prom/stm   26'                                                                                                                                             Therapeutic Exercise and NMR EXR  [x] (08043) Provided verbal/tactile cueing for activities related to strengthening, flexibility, endurance, ROM for improvements in LE, proximal hip, and core control with self care, mobility, lifting, ambulation. [x] (87829) Provided verbal/tactile cueing for activities related to improving balance, coordination, kinesthetic sense, posture, motor skill, proprioception to assist with LE, proximal hip, and core control in self-care, mobility, lifting, ambulation and eccentric single leg control. NMR and Therapeutic Activities:    [x] (40505 or 54070) Provided verbal/tactile cueing for activities related to improving balance, coordination, kinesthetic sense, posture, motor skill, proprioception and motor activation to allow for proper function of core, proximal hip and LE with self-care and ADLs and functional mobility.   [] (89892) Gait Re-education- Provided training and instruction to the patient for proper LE, core and proximal hip recruitment and positioning and eccentric body weight control with ambulation re-education including up and down stairs     Home Exercise Program:    [x] (95403) Reviewed/Progressed HEP activities related to strengthening, flexibility, endurance, ROM of core, proximal hip and LE for functional self-care, mobility, lifting and ambulation/stair navigation   [] (92236) Reviewed/Progressed HEP activities related to improving balance, coordination, kinesthetic sense, posture, motor skill, proprioception of core, proximal hip and LE for self-care, mobility, lifting, and ambulation/stair navigation      Manual Treatments:  PROM / STM / Oscillations-Mobs:  G-I, II, III, IV (PA's, Inf., Post.)  [x] (58711) Provided manual therapy to mobilize LE, proximal hip and/or LS spine soft tissue/joints for the purpose of modulating pain, promoting relaxation, increasing ROM, reducing/eliminating soft tissue swelling/inflammation/restriction, improving soft tissue extensibility and allowing for proper ROM for normal function with self-care, mobility, lifting and ambulation. Modalities:     [] GAME READY (VASO)- for significant edema, swelling, pain control.      Charges:  Timed Code Treatment Minutes: 43'    Total Treatment Minutes: 43'       [] EVAL (LOW) 49917 (typically 20 minutes face-to-face)  [] EVAL (MOD) 46714 (typically 30 minutes face-to-face)  [] EVAL (HIGH) 67675 (typically 45 minutes face-to-face)  [] RE-EVAL     [x] LM(07420) x    1 [] IONTO  [] NMR (09398) x     [] VASO  [x] Manual (79335) x   2 [] Other:  [] TA x      [] Mech Traction (38917)  [] ES(attended) (74763)      [] ES (un) (63809):         ASSESSMENT:  See eval      GOALS:   Patient stated goal:    [] Progressing: [] Met: [] Not Met: [] Adjusted    Therapist goals for Patient:   Short Term Goals: To be achieved in: 2 weeks  1. Independent in HEP and progression per patient tolerance, in order to prevent re-injury. [] Progressing: [] Met: [] Not Met: [] Adjusted   2. Patient will have a decrease in pain to facilitate improvement in movement, function, and ADLs as indicated by Functional Deficits. [] Progressing: [] Met: [] Not Met: [] Adjusted    Long Term Goals: To be achieved in:    8  weeks  - The patient is expected to demonstrate less than  16% impairment, limitation or restriction in:  - - carrying, moving and handling objects. - Patient will demonstrate increased passive and active ROM to full to allow for proper joint functioning as indicated by patients Functional Deficits. [] Progressing: [] Met: [] Not Met: [] Adjusted   - Patient will demonstrate an increase in Strength to 4/5  to allow for proper functional mobility as indicated by patients Functional Deficits. [] Progressing: [] Met: [] Not Met: [] Adjusted   - Patient will return to desired, higher level upper extremity functional activities without increased symptoms or restriction.       [] Progressing: [] Met: [] Not Met: [] Adjusted     Overall Progression Towards Functional goals/ Treatment Progress Update:  [] Patient is progressing as expected towards functional goals listed. [] Progression is slowed due to complexities/Impairments listed.   [] Progression has been slowed due to co-morbidities. [x] Plan just implemented, too soon to assess goals progression <30days   [] Goals require adjustment due to lack of progress  [] Patient is not progressing as expected and requires additional follow up with physician  [] Other    Prognosis for POC: [x] Good [] Fair  [] Poor      Patient requires continued skilled intervention: [x] Yes  [] No    Treatment/Activity Tolerance:  [x] Patient able to complete treatment  [] Patient limited by fatigue  [] Patient limited by pain    [] Patient limited by other medical complications  [] Other:         Return to Play: (if applicable)   []  Stage 1: Intro to Strength   []  Stage 2: Return to Run and Strength   []  Stage 3: Return to Jump and Strength   []  Stage 4: Dynamic Strength and Agility   []  Stage 5: Sport Specific Training     []  Ready to Return to Play, Meets All Above Stages   []  Not Ready for Return to Sports   Comments:                               PLAN:   [x] Continue per plan of care [] Alter current plan (see comments above)  [] Plan of care initiated [] Hold pending MD visit [] Discharge      Electronically signed by:  Bernard Erickson, PT, MPT     Note: If patient does not return for scheduled/ recommended follow up visits, this note will serve as a discharge from care along with most recent update on progress.

## 2020-09-17 NOTE — FLOWSHEET NOTE
[address] and [facility]       Physical Therapy Treatment Note/ Progress Report:           Date:  2020  Patient Name:  Pascual Ribeiro    :  1950  MRN: [id number]  Restrictions/Precautions:    Medical/Treatment Diagnosis Information:  ·   Diagnosis: M25.511 (ICD-10-CM) - Right shoulder pain, unspecified chronicity  Treatment Diagnosis: M25.511 (ICD-10-CM) - Right shoulder pain, unspecified chronicity     Insurance/Certification information:     Physician Information:     Has the plan of care been signed (Y/N):        []  Yes  [x]  No     Date of Patient follow up with Physician:       Is this a Progress Report:     []  Yes  [x]  No        If Yes:  Date Range for reporting period:  Beginning   Ending     Progress report will be due (10 Rx or 30 days whichever is less):        Recertification will be due (POC Duration  / 90 days whichever is less):          Visit # Insurance Allowable Auth Required   3   []  Yes []  No        Functional Scale:     Date assessed:         Latex Allergy:  [x]NO      []YES  Preferred Language for Healthcare:   [x]English       []other:      Pain level:  4/10     SUBJECTIVE: \"I am feeling better. Anya Messing \"     OBJECTIVE:  Passive 155 flexion    Observation:    Test measurements:      RESTRICTIONS/PRECAUTIONS:    Exercises/Interventions:       Therapeutic Ex (57535) Sets/sec Reps Notes/CUES   Wall stretch   6 x 10\"     Supine er @ 45 degrees supine   5 x 20\"     Pinches   30x     Punches   25x    sidelying er   25x     Standing scaptions   25x                Pulleys   3'                       Manual Intervention (19913)      Prom/stm   26'                                                                                                                                             Therapeutic Exercise and NMR EXR  [x] (35829) Provided verbal/tactile cueing for activities related to strengthening, flexibility, endurance, ROM for improvements in LE, proximal hip, and core control with self care, mobility, lifting, ambulation. [x] (01475) Provided verbal/tactile cueing for activities related to improving balance, coordination, kinesthetic sense, posture, motor skill, proprioception to assist with LE, proximal hip, and core control in self-care, mobility, lifting, ambulation and eccentric single leg control. NMR and Therapeutic Activities:    [x] (50753 or 36058) Provided verbal/tactile cueing for activities related to improving balance, coordination, kinesthetic sense, posture, motor skill, proprioception and motor activation to allow for proper function of core, proximal hip and LE with self-care and ADLs and functional mobility.   [] (11184) Gait Re-education- Provided training and instruction to the patient for proper LE, core and proximal hip recruitment and positioning and eccentric body weight control with ambulation re-education including up and down stairs     Home Exercise Program:    [x] (06070) Reviewed/Progressed HEP activities related to strengthening, flexibility, endurance, ROM of core, proximal hip and LE for functional self-care, mobility, lifting and ambulation/stair navigation   [] (40174) Reviewed/Progressed HEP activities related to improving balance, coordination, kinesthetic sense, posture, motor skill, proprioception of core, proximal hip and LE for self-care, mobility, lifting, and ambulation/stair navigation      Manual Treatments:  PROM / STM / Oscillations-Mobs:  G-I, II, III, IV (PA's, Inf., Post.)  [x] (28000) Provided manual therapy to mobilize LE, proximal hip and/or LS spine soft tissue/joints for the purpose of modulating pain, promoting relaxation, increasing ROM, reducing/eliminating soft tissue swelling/inflammation/restriction, improving soft tissue extensibility and allowing for proper ROM for normal function with self-care, mobility, lifting and ambulation.      Modalities:     [] GAME READY (VASO)- for significant edema, swelling, pain control. Charges:  Timed Code Treatment Minutes: 43'    Total Treatment Minutes: 43'       [] EVAL (LOW) 12996 (typically 20 minutes face-to-face)  [] EVAL (MOD) 38612 (typically 30 minutes face-to-face)  [] EVAL (HIGH) 15813 (typically 45 minutes face-to-face)  [] RE-EVAL     [x] SW(55148) x    1 [] IONTO  [] NMR (98448) x     [] VASO  [x] Manual (12224) x   2 [] Other:  [] TA x      [] Mech Traction (75134)  [] ES(attended) (50701)      [] ES (un) (39375):         ASSESSMENT:  See eval      GOALS:   Patient stated goal:    [] Progressing: [] Met: [] Not Met: [] Adjusted    Therapist goals for Patient:   Short Term Goals: To be achieved in: 2 weeks  1. Independent in HEP and progression per patient tolerance, in order to prevent re-injury. [] Progressing: [] Met: [] Not Met: [] Adjusted   2. Patient will have a decrease in pain to facilitate improvement in movement, function, and ADLs as indicated by Functional Deficits. [] Progressing: [] Met: [] Not Met: [] Adjusted    Long Term Goals: To be achieved in:    8  weeks  - The patient is expected to demonstrate less than  16% impairment, limitation or restriction in:  - - carrying, moving and handling objects. - Patient will demonstrate increased passive and active ROM to full to allow for proper joint functioning as indicated by patients Functional Deficits. [x] Progressing: [] Met: [] Not Met: [] Adjusted   - Patient will demonstrate an increase in Strength to 4/5  to allow for proper functional mobility as indicated by patients Functional Deficits.    [x] Progressing: [] Met: [] Not Met: [] Adjusted   - Patient will return to desired, higher level upper extremity functional activities without increased symptoms or restriction.       [x] Progressing: [] Met: [] Not Met: [] Adjusted     Overall Progression Towards Functional goals/ Treatment Progress Update:  [] Patient is progressing as expected towards functional goals listed. [] Progression is slowed due to complexities/Impairments listed. [] Progression has been slowed due to co-morbidities. [x] Plan just implemented, too soon to assess goals progression <30days   [] Goals require adjustment due to lack of progress  [] Patient is not progressing as expected and requires additional follow up with physician  [] Other    Prognosis for POC: [x] Good [] Fair  [] Poor      Patient requires continued skilled intervention: [x] Yes  [] No    Treatment/Activity Tolerance:  [x] Patient able to complete treatment  [] Patient limited by fatigue  [] Patient limited by pain    [] Patient limited by other medical complications  [] Other:         Return to Play: (if applicable)   []  Stage 1: Intro to Strength   []  Stage 2: Return to Run and Strength   []  Stage 3: Return to Jump and Strength   []  Stage 4: Dynamic Strength and Agility   []  Stage 5: Sport Specific Training     []  Ready to Return to Play, Meets All Above Stages   []  Not Ready for Return to Sports   Comments:                               PLAN:   [x] Continue per plan of care [] Alter current plan (see comments above)  [] Plan of care initiated [] Hold pending MD visit [] Discharge      Electronically signed by:  Wilbur Negro PT, MPT     Note: If patient does not return for scheduled/ recommended follow up visits, this note will serve as a discharge from care along with most recent update on progress.

## 2020-09-23 ENCOUNTER — HOSPITAL ENCOUNTER (OUTPATIENT)
Dept: PHYSICAL THERAPY | Age: 70
Setting detail: THERAPIES SERIES
Discharge: HOME | End: 2020-09-23
Payer: MEDICARE

## 2020-09-23 PROCEDURE — 97110 THERAPEUTIC EXERCISES: CPT | Performed by: PHYSICAL THERAPIST

## 2020-09-23 PROCEDURE — 97140 MANUAL THERAPY 1/> REGIONS: CPT | Performed by: PHYSICAL THERAPIST

## 2020-09-23 NOTE — FLOWSHEET NOTE
The [address] [place] [address] and [facility]       Physical Therapy Treatment Note/ Progress Report:           Date:  2020  Patient Name:  Ming Bo    :  1950  MRN: [id number]  Restrictions/Precautions:    Medical/Treatment Diagnosis Information:  ·   Diagnosis: M25.511 (ICD-10-CM) - Right shoulder pain, unspecified chronicity  Treatment Diagnosis: M25.511 (ICD-10-CM) - Right shoulder pain, unspecified chronicity     Insurance/Certification information:     Physician Information:     Has the plan of care been signed (Y/N):        []  Yes  [x]  No     Date of Patient follow up with Physician:       Is this a Progress Report:     []  Yes  [x]  No        If Yes:  Date Range for reporting period:  Beginning   Ending     Progress report will be due (10 Rx or 30 days whichever is less):        Recertification will be due (POC Duration  / 90 days whichever is less):          Visit # Insurance Allowable Auth Required   4   []  Yes []  No        Functional Scale:     Date assessed:         Latex Allergy:  [x]NO      []YES  Preferred Language for Healthcare:   [x]English       []other:      Pain level:  4/10     SUBJECTIVE: Pt stated her shoulder is doing okay. Believes the injection she received may be wearing off as she has noticed a little bit of pain for the first time since the injection.     OBJECTIVE:  Passive 155 flexion    Observation:    Test measurements:      RESTRICTIONS/PRECAUTIONS:    Exercises/Interventions:       Therapeutic Ex (91900) Sets/sec Reps Notes/CUES   Wall stretch   6 x 10\"     Supine er @ 45 degrees supine   5 x 20\"     Pinches   30x     Punches   30x    sidelying er   25x     Standing scaptions   20x Mirror for visual cues   Standing forward reaches  20x Mirror for visual cues         Pulleys   3'                       Manual Intervention (39789)      Prom/stm   25' swelling/inflammation/restriction, improving soft tissue extensibility and allowing for proper ROM for normal function with self-care, mobility, lifting and ambulation. Modalities:     [] GAME READY (VASO)- for significant edema, swelling, pain control. Charges:  Timed Code Treatment Minutes: 43'    Total Treatment Minutes: 43'       [] EVAL (LOW) 25194 (typically 20 minutes face-to-face)  [] EVAL (MOD) 89917 (typically 30 minutes face-to-face)  [] EVAL (HIGH) 56805 (typically 45 minutes face-to-face)  [] RE-EVAL     [x] MQ(09591) x    1 [] IONTO  [] NMR (19733) x     [] VASO  [x] Manual (87432) x   2 [] Other:  [] TA x      [] Mech Traction (15554)  [] ES(attended) (14278)      [] ES (un) (53026):         ASSESSMENT:  Pt tolerated session well. Displayed humeral head shifting in the glenoid during PROM but no reported pain. Slowly progressing strengthening exercises to more functional movements with no increase in symptoms. GOALS:   Patient stated goal:    [] Progressing: [] Met: [] Not Met: [] Adjusted    Therapist goals for Patient:   Short Term Goals: To be achieved in: 2 weeks  1. Independent in HEP and progression per patient tolerance, in order to prevent re-injury. [] Progressing: [] Met: [] Not Met: [] Adjusted   2. Patient will have a decrease in pain to facilitate improvement in movement, function, and ADLs as indicated by Functional Deficits. [] Progressing: [] Met: [] Not Met: [] Adjusted    Long Term Goals: To be achieved in:    8  weeks  - The patient is expected to demonstrate less than  16% impairment, limitation or restriction in:  - - carrying, moving and handling objects. - Patient will demonstrate increased passive and active ROM to full to allow for proper joint functioning as indicated by patients Functional Deficits.    [x] Progressing: [] Met: [] Not Met: [] Adjusted   - Patient will demonstrate an increase in Strength to 4/5  to allow for proper functional mobility as

## 2020-10-08 ENCOUNTER — OFFICE VISIT (OUTPATIENT)
Dept: ORTHOPEDIC SURGERY | Age: 70
End: 2020-10-08
Payer: MEDICARE

## 2020-10-08 PROCEDURE — G8427 DOCREV CUR MEDS BY ELIG CLIN: HCPCS | Performed by: ORTHOPAEDIC SURGERY

## 2020-10-08 PROCEDURE — 4040F PNEUMOC VAC/ADMIN/RCVD: CPT | Performed by: ORTHOPAEDIC SURGERY

## 2020-10-08 PROCEDURE — G8484 FLU IMMUNIZE NO ADMIN: HCPCS | Performed by: ORTHOPAEDIC SURGERY

## 2020-10-08 PROCEDURE — G8417 CALC BMI ABV UP PARAM F/U: HCPCS | Performed by: ORTHOPAEDIC SURGERY

## 2020-10-08 PROCEDURE — 1090F PRES/ABSN URINE INCON ASSESS: CPT | Performed by: ORTHOPAEDIC SURGERY

## 2020-10-08 PROCEDURE — 1036F TOBACCO NON-USER: CPT | Performed by: ORTHOPAEDIC SURGERY

## 2020-10-08 PROCEDURE — G8400 PT W/DXA NO RESULTS DOC: HCPCS | Performed by: ORTHOPAEDIC SURGERY

## 2020-10-08 PROCEDURE — 3017F COLORECTAL CA SCREEN DOC REV: CPT | Performed by: ORTHOPAEDIC SURGERY

## 2020-10-08 PROCEDURE — 99213 OFFICE O/P EST LOW 20 MIN: CPT | Performed by: ORTHOPAEDIC SURGERY

## 2020-10-08 PROCEDURE — 1123F ACP DISCUSS/DSCN MKR DOCD: CPT | Performed by: ORTHOPAEDIC SURGERY

## 2020-10-08 RX ORDER — GABAPENTIN 300 MG/1
300 CAPSULE ORAL 3 TIMES DAILY
COMMUNITY
End: 2021-03-30

## 2020-10-08 RX ORDER — PREDNISONE 50 MG/1
50 TABLET ORAL DAILY
COMMUNITY
End: 2021-03-30

## 2020-10-08 NOTE — PROGRESS NOTES
Chief Complaint    Follow-up (Right Shoulder)      History of Present Illness: Jessica Martinez is a pleasant, 72 y.o., female, here today for follow up of her right shoulder. We initiated conservative treatment for right glenohumeral osteoarthritis. She has been in physical therapy at the [facility] office working with Derrick. We did administer an injection back on 8/25/20 and prescribe Meloxicam. She does feel these modalities have significantly improved her symptoms. She would like to continue in physical therapy. She does have a herniated disc in her back and is struggling with neurological symptoms into her lower extremities. She is being seen at [facility] for this. She is on prednisone which she does feel is helping with her shoulder as well. She reports no new injuries or setbacks. Pain Assessment  Location of Pain: Shoulder  Location Modifiers: Right  Severity of Pain: 2  Quality of Pain: Aching  Duration of Pain: Persistent  Frequency of Pain: Intermittent  Aggravating Factors: Exercise  Limiting Behavior: Some  Relieving Factors: Rest, Ice  Result of Injury: No  Work-Related Injury: No  Are there other pain locations you wish to document?: No      Medical History:  Patient's medications, allergies, past medical, surgical, social and family histories were reviewed and updated as appropriate. Review of Systems    No changes since last updated    Review of Systems  A 14 point review of systems was completed by the patient on 10/8/20 and is available in the media section of the scanned medical record and was reviewed on 10/8/2020. The review is negative with the exception of those things mentioned in the HPI and Past Medical History    Vital Signs: There were no vitals filed for this visit. General/Appearance: Alert and oriented and in no apparent distress. Skin:  There are no skin lesions, cellulitis, or extreme edema.  The patient has warm and well-perfused Bilateral upper extremities with brisk capillary refill. Right Shoulder Exam:  Inspection: No gross deformities, no signs of infection. Palpation: Non-tender    Active Range of Motion: Forward Elevation 165, Abduction 165, External Rotation 20, Internal Rotation L4    Passive Range of Motion: Deferred    Strength:  External Rotation 5/5, Internal Rotation 5/5    Special Tests:  No Cameron muscle deformity. Neurovascular: Sensation to light touch is intact, no motor deficits, palpable radial pulses 2+      Radiology:     No new XR obtained at this time. Assessment :  Harry Villalobos is a 72 y.o. female with right glenohumeral osteoarthritis. We can continue with conservative treatment for as long as it is helpful. Impression:  Encounter Diagnosis   Name Primary?  Osteoarthritis of glenohumeral joint, right Yes       Office Procedures:  Orders Placed This Encounter   Procedures    [facility]     Referral Priority:   Routine     Referral Type:   Eval and Treat     Referral Reason:   Specialty Services Required     Requested Specialty:   Physical Therapy     Number of Visits Requested:   1       Treatment Plan: Harry Villalobos has responded very well to conservative treatment. We recommend She continue in physical therapy at the [facility] office. A new physical therapy letter was documented in EPIC today. We will see Marika Koenig back in 4-6 weeks and/or as needed. All questions were answered to patient's satisfaction and She was encouraged to call with any further questions or concerns. Riley Alessandra is in agreement with this plan. 10/8/2020  12:23 PM    Clif Villafuerte ATC  [facility] [address]    During this examination, I, Esther Delgadillo, functioned as a scribe for Dr. Lisa León. The history taking and physical examination were performed by Dr. Angus Merritt. All counseling during the appointment was performed between the patient and Dr. Angus Merritt. 10/8/20   ___________________  I, Dr. Enma Mac, personally performed the services described in this documentation as described by Shania Orozco ATC in my presence, and it is both accurate and complete. Dinorah Dugan MD, PhD  10/8/2020

## 2020-11-03 PROBLEM — F32.A DEPRESSION: Status: RESOLVED | Noted: 2019-08-21 | Resolved: 2020-11-03

## 2021-01-29 ENCOUNTER — TELEPHONE (OUTPATIENT)
Dept: ORTHOPEDIC SURGERY | Age: 71
End: 2021-01-29

## 2021-03-30 ENCOUNTER — OFFICE VISIT (OUTPATIENT)
Dept: ORTHOPEDIC SURGERY | Age: 71
End: 2021-03-30
Payer: MEDICARE

## 2021-03-30 VITALS — WEIGHT: 225 LBS | HEIGHT: 69 IN | BODY MASS INDEX: 33.33 KG/M2

## 2021-03-30 DIAGNOSIS — G89.29 CHRONIC LEFT SHOULDER PAIN: ICD-10-CM

## 2021-03-30 DIAGNOSIS — M25.511 RIGHT SHOULDER PAIN, UNSPECIFIED CHRONICITY: ICD-10-CM

## 2021-03-30 DIAGNOSIS — M19.011 OSTEOARTHRITIS OF GLENOHUMERAL JOINT, RIGHT: Primary | ICD-10-CM

## 2021-03-30 DIAGNOSIS — M25.512 CHRONIC LEFT SHOULDER PAIN: ICD-10-CM

## 2021-03-30 PROCEDURE — 4040F PNEUMOC VAC/ADMIN/RCVD: CPT | Performed by: ORTHOPAEDIC SURGERY

## 2021-03-30 PROCEDURE — G8427 DOCREV CUR MEDS BY ELIG CLIN: HCPCS | Performed by: ORTHOPAEDIC SURGERY

## 2021-03-30 PROCEDURE — 1123F ACP DISCUSS/DSCN MKR DOCD: CPT | Performed by: ORTHOPAEDIC SURGERY

## 2021-03-30 PROCEDURE — G8417 CALC BMI ABV UP PARAM F/U: HCPCS | Performed by: ORTHOPAEDIC SURGERY

## 2021-03-30 PROCEDURE — 1036F TOBACCO NON-USER: CPT | Performed by: ORTHOPAEDIC SURGERY

## 2021-03-30 PROCEDURE — 99213 OFFICE O/P EST LOW 20 MIN: CPT | Performed by: ORTHOPAEDIC SURGERY

## 2021-03-30 PROCEDURE — 3017F COLORECTAL CA SCREEN DOC REV: CPT | Performed by: ORTHOPAEDIC SURGERY

## 2021-03-30 PROCEDURE — 1090F PRES/ABSN URINE INCON ASSESS: CPT | Performed by: ORTHOPAEDIC SURGERY

## 2021-03-30 PROCEDURE — 20610 DRAIN/INJ JOINT/BURSA W/O US: CPT | Performed by: ORTHOPAEDIC SURGERY

## 2021-03-30 PROCEDURE — G8400 PT W/DXA NO RESULTS DOC: HCPCS | Performed by: ORTHOPAEDIC SURGERY

## 2021-03-30 PROCEDURE — G8484 FLU IMMUNIZE NO ADMIN: HCPCS | Performed by: ORTHOPAEDIC SURGERY

## 2021-03-30 RX ORDER — GABAPENTIN 600 MG/1
TABLET ORAL
COMMUNITY
Start: 2021-03-02 | End: 2021-10-05

## 2021-03-30 RX ORDER — LORAZEPAM 0.5 MG/1
TABLET ORAL
COMMUNITY
Start: 2021-03-01

## 2021-03-30 RX ORDER — LISDEXAMFETAMINE DIMESYLATE 60 MG/1
CAPSULE ORAL
COMMUNITY
Start: 2021-03-22

## 2021-03-30 RX ORDER — LIDOCAINE HYDROCHLORIDE 10 MG/ML
8 INJECTION, SOLUTION INFILTRATION; PERINEURAL ONCE
Status: COMPLETED | OUTPATIENT
Start: 2021-03-30 | End: 2021-03-30

## 2021-03-30 RX ORDER — BACLOFEN 10 MG/1
TABLET ORAL
COMMUNITY
Start: 2021-03-09

## 2021-03-30 RX ORDER — FLUOXETINE HYDROCHLORIDE 40 MG/1
CAPSULE ORAL
COMMUNITY
Start: 2021-01-15 | End: 2021-11-23 | Stop reason: CLARIF

## 2021-03-30 RX ORDER — METHOCARBAMOL 750 MG/1
TABLET, FILM COATED ORAL
COMMUNITY
Start: 2021-01-30 | End: 2021-03-30

## 2021-03-30 RX ORDER — METHYLPREDNISOLONE ACETATE 40 MG/ML
80 INJECTION, SUSPENSION INTRA-ARTICULAR; INTRALESIONAL; INTRAMUSCULAR; SOFT TISSUE ONCE
Status: COMPLETED | OUTPATIENT
Start: 2021-03-30 | End: 2021-03-30

## 2021-03-30 RX ADMIN — METHYLPREDNISOLONE ACETATE 80 MG: 40 INJECTION, SUSPENSION INTRA-ARTICULAR; INTRALESIONAL; INTRAMUSCULAR; SOFT TISSUE at 15:03

## 2021-03-30 RX ADMIN — METHYLPREDNISOLONE ACETATE 80 MG: 40 INJECTION, SUSPENSION INTRA-ARTICULAR; INTRALESIONAL; INTRAMUSCULAR; SOFT TISSUE at 15:04

## 2021-03-30 RX ADMIN — LIDOCAINE HYDROCHLORIDE 8 ML: 10 INJECTION, SOLUTION INFILTRATION; PERINEURAL at 15:03

## 2021-03-30 RX ADMIN — LIDOCAINE HYDROCHLORIDE 8 ML: 10 INJECTION, SOLUTION INFILTRATION; PERINEURAL at 15:01

## 2021-03-30 NOTE — PROGRESS NOTES
Forward Elevation 150, Abduction 150, External Rotation 15, Internal Rotation L5    Passive Range of Motion: Not tested    Strength:  Not tested    Special Tests: No Cameron muscle deformity. Left Shoulder Exam:  Inspection: No gross deformities, no signs of infection. Palpation: Tenderness not appreciated at this time. Active Range of Motion: Forward Elevation 140, Abduction 140, External Rotation 25, Internal Rotation flank    Passive Range of Motion: Not tested    Strength:  Not tested    Special Tests: No Camerno muscle deformity. Neurovascular: Sensation to light touch is intact, no motor deficits, palpable radial pulses 2+      Radiology:     Plain radiographs of the bilateral shoulders comprising 3 views: Axillary, AP Internal and External were obtained and reviewed in the office. Impression: Bilateral GH OA is noted. Assessment :  Ms. Saray Arnold is a pleasant, 80 y.o. patient who is doing well despite the [facility] OA. Her shoulder motion is actually good and pain during ADLs is manageable. Impression:  Encounter Diagnoses   Name Primary?  Osteoarthritis of glenohumeral joint, right Yes    Right shoulder pain, unspecified chronicity     Chronic left shoulder pain        Office Procedures:  Orders Placed This Encounter   Procedures    XR SHOULDER RIGHT (MIN 2 VIEWS)     Standing Status:   Future     Number of Occurrences:   1     Standing Expiration Date:   3/30/2022     Order Specific Question:   Reason for exam:     Answer:   pain    XR SHOULDER LEFT (MIN 2 VIEWS)     Standing Status:   Future     Number of Occurrences:   1     Standing Expiration Date:   3/30/2022     Order Specific Question:   Reason for exam:     Answer:   pain    MD ARTHROCENTESIS ASPIR&/INJ MAJOR JT/BURSA W/O US     80 mg Depomedrol with 8 cc 1% Lidocaine in 10cc syringe with 25G (22G) needle       Treatment Plan:  Patient requested cortisone injections today in the office.  After discussing the risks and benefits of a corticosteroid injection, Caryn Santana did state an understanding and gave verbal consent to proceed. After cleansing the injection site with Chlora-prep and using aseptic techniques,  2 CC of Depo Medrol 40mg/ml and 8 CC of 1% lidocaine were injected in the left and right shoulders. She  tolerated the procedure well with no immediate adverse sequelae after the injection. A bandage was placed over the injection site. Appropriate post injections instructions were given to the patient. We will see Caryn Santana back in 3 months or sooner if  needed. All questions were answered to patient's satisfaction and She was encouraged to call with any further questions or concerns. Darling Dona is in agreement with this plan. Alma Garibay, am scribing for and in the presence of Dr. Umer Alvarado.   03/30/21 The history taking and physical examination were performed by Dr. Rod León. All counseling during the appointment was performed between the patient and Dr. Rod León. 11:05 AM  ________________  I, Dr. Umer Alvarado, personally performed the services described in this documentation as described by LEILA Martinez in my presence, and it is both accurate and complete. Dinorah León MD, PhD  3/30/2021

## 2021-04-19 ENCOUNTER — TELEPHONE (OUTPATIENT)
Dept: ORTHOPEDIC SURGERY | Age: 71
End: 2021-04-19

## 2021-04-19 NOTE — TELEPHONE ENCOUNTER
General Question     Subject: NEED STRONGER MEDS FOR SHOULDER  Patient and /or Facility Request: PATIENT  Contact Number: [phone]

## 2021-04-29 RX ORDER — MELOXICAM 15 MG/1
15 TABLET ORAL DAILY PRN
Qty: 90 TABLET | Refills: 0 | Status: SHIPPED | OUTPATIENT
Start: 2021-04-29 | End: 2021-07-26

## 2021-05-03 NOTE — TELEPHONE ENCOUNTER
She will need to be seen in Dr. Eldon Page office to discuss any additional pain medications for her shoulder.

## 2021-05-03 NOTE — TELEPHONE ENCOUNTER
She will need to be seen in Dr. Elsie Rodrigez office to discuss any additional pain medications for her shoulder.

## 2021-07-26 RX ORDER — MELOXICAM 15 MG/1
15 TABLET ORAL DAILY PRN
Qty: 90 TABLET | Refills: 0 | Status: SHIPPED | OUTPATIENT
Start: 2021-07-26 | End: 2021-10-22

## 2021-10-05 ENCOUNTER — OFFICE VISIT (OUTPATIENT)
Dept: ORTHOPEDIC SURGERY | Age: 71
End: 2021-10-05
Payer: MEDICARE

## 2021-10-05 VITALS — BODY MASS INDEX: 33.33 KG/M2 | WEIGHT: 225 LBS | HEIGHT: 69 IN

## 2021-10-05 DIAGNOSIS — M19.011 OSTEOARTHRITIS OF GLENOHUMERAL JOINT, RIGHT: Primary | ICD-10-CM

## 2021-10-05 PROBLEM — R29.898 WEAKNESS OF BOTH LOWER EXTREMITIES: Status: ACTIVE | Noted: 2020-10-19

## 2021-10-05 PROBLEM — R27.0 ATAXIA: Status: ACTIVE | Noted: 2020-10-19

## 2021-10-05 PROBLEM — R26.9 GAIT DISORDER: Status: ACTIVE | Noted: 2020-10-28

## 2021-10-05 PROBLEM — M51.04 HERNIATION OF INTERVERTEBRAL DISC OF THORACIC SPINE WITH MYELOPATHY: Status: ACTIVE | Noted: 2020-10-22

## 2021-10-05 PROBLEM — G82.20 PARAPARESIS (HCC): Status: ACTIVE | Noted: 2020-10-28

## 2021-10-05 PROCEDURE — 1090F PRES/ABSN URINE INCON ASSESS: CPT | Performed by: ORTHOPAEDIC SURGERY

## 2021-10-05 PROCEDURE — 1123F ACP DISCUSS/DSCN MKR DOCD: CPT | Performed by: ORTHOPAEDIC SURGERY

## 2021-10-05 PROCEDURE — G8484 FLU IMMUNIZE NO ADMIN: HCPCS | Performed by: ORTHOPAEDIC SURGERY

## 2021-10-05 PROCEDURE — G8427 DOCREV CUR MEDS BY ELIG CLIN: HCPCS | Performed by: ORTHOPAEDIC SURGERY

## 2021-10-05 PROCEDURE — 1036F TOBACCO NON-USER: CPT | Performed by: ORTHOPAEDIC SURGERY

## 2021-10-05 PROCEDURE — 20610 DRAIN/INJ JOINT/BURSA W/O US: CPT | Performed by: ORTHOPAEDIC SURGERY

## 2021-10-05 PROCEDURE — 4040F PNEUMOC VAC/ADMIN/RCVD: CPT | Performed by: ORTHOPAEDIC SURGERY

## 2021-10-05 PROCEDURE — G8400 PT W/DXA NO RESULTS DOC: HCPCS | Performed by: ORTHOPAEDIC SURGERY

## 2021-10-05 PROCEDURE — 99213 OFFICE O/P EST LOW 20 MIN: CPT | Performed by: ORTHOPAEDIC SURGERY

## 2021-10-05 PROCEDURE — 3017F COLORECTAL CA SCREEN DOC REV: CPT | Performed by: ORTHOPAEDIC SURGERY

## 2021-10-05 PROCEDURE — G8417 CALC BMI ABV UP PARAM F/U: HCPCS | Performed by: ORTHOPAEDIC SURGERY

## 2021-10-05 RX ORDER — LIDOCAINE HYDROCHLORIDE 10 MG/ML
8 INJECTION, SOLUTION INFILTRATION; PERINEURAL ONCE
Status: COMPLETED | OUTPATIENT
Start: 2021-10-05 | End: 2021-10-05

## 2021-10-05 RX ORDER — GABAPENTIN 300 MG/1
CAPSULE ORAL
COMMUNITY
Start: 2021-08-30

## 2021-10-05 RX ORDER — METHYLPREDNISOLONE ACETATE 40 MG/ML
80 INJECTION, SUSPENSION INTRA-ARTICULAR; INTRALESIONAL; INTRAMUSCULAR; SOFT TISSUE ONCE
Status: COMPLETED | OUTPATIENT
Start: 2021-10-05 | End: 2021-10-05

## 2021-10-05 RX ADMIN — LIDOCAINE HYDROCHLORIDE 8 ML: 10 INJECTION, SOLUTION INFILTRATION; PERINEURAL at 16:50

## 2021-10-05 RX ADMIN — METHYLPREDNISOLONE ACETATE 80 MG: 40 INJECTION, SUSPENSION INTRA-ARTICULAR; INTRALESIONAL; INTRAMUSCULAR; SOFT TISSUE at 16:51

## 2021-10-05 NOTE — PROGRESS NOTES
Chief Complaint    Shoulder Pain (F/U RIGHT SHOULDER)       History of Present Illness: Danay Ortiz is a pleasant, 80 y.o., female, here today for follow up of her right shoulder. We have treated this patient for many years regarding bilateral glenohumeral osteoarthritis. Primary treatment has been inclusive of corticosteroid injections. Her most recent set of injections being on 3/30/21. She does use a wheelchair or walker for ambulation due to history of back problems. She is accompanied today by her daughter. She does have upcoming plastic surgery and therefore has had to discontinue anti-inflammatories. This has led to an increase in shoulder pain, especially the right. Pain Assessment  Location of Pain: Shoulder  Location Modifiers: Right  Severity of Pain: 7  Quality of Pain: Aching, Other (Comment), Sharp  Duration of Pain: Persistent  Frequency of Pain: Constant  Aggravating Factors: Other (Comment)  Limiting Behavior: Yes  Relieving Factors: Rest, Nsaids, Other (Comment), Ice  Work-Related Injury: No  Are there other pain locations you wish to document?: No      Medical History:  Patient's medications, allergies, past medical, surgical, social and family histories were reviewed and updated as appropriate. Patient has had no medical changes since last evaluated        Review of Systems  A 14 point review of systems was completed by the patient on 3/30/21 and is available in the media section of the scanned medical record and was reviewed on 10/5/2021. The review is negative with the exception of those things mentioned in the HPI and Past Medical History    Vital Signs: There were no vitals filed for this visit. General/Appearance: Alert and oriented and in no apparent distress. Skin:  There are no skin lesions, cellulitis, or extreme edema. The patient has warm and well-perfused Bilateral upper extremities with brisk capillary refill.       Right and Left Shoulder Exam:  Inspection: No gross deformities, no signs of infection. Palpation: Grinding/clunking present is present bilaterally although worse on the right    Active Range of Motion: Forward Elevation 120 left vs 100 on the right, Internal Rotation L5 left vs thigh on the right    Passive Range of Motion: Deferred    Strength: Deferred    Special Tests: No Cameron muscle deformity    Neurovascular: Sensation to light touch is intact, no motor deficits, palpable radial pulses 2+      Radiology:     No new XR obtained at this time. Assessment :  Ms. Bernice Stephens is a pleasant, 80 y.o. patient with bilateral glenohumeral osteoarthritis. Impression:  Encounter Diagnosis   Name Primary?  Osteoarthritis of glenohumeral joint, right Yes       Office Procedures:  Orders Placed This Encounter   Procedures    SC ARTHROCENTESIS ASPIR&/INJ MAJOR JT/BURSA W/O US       Treatment Plan:  We explained she is a candidate for a total shoulder arthroplasty. We feel she will make an excellent recovery following this operation. We will go ahead and proceed with an injection today and provide total shoulder handout for this patient. Both the patient and her daughter are in agreement with this. We will see Fabiana Anne back in 6 weeks and/or as needed. All questions were answered to patient's satisfaction and She was encouraged to call with any further questions or concerns. Maryjane Giulia is in agreement with this plan. After discussing the risks and benefits of a corticosteroid injection, Fabiana Anne did state an understanding and gave verbal consent to proceed. After cleansing the injection site with Chlora-prep and using aseptic techniques,  2 CC of Depo Medrol 40mg/ml and 8 CC of 1% lidocaine were injected in the right glenohumeral joint. She  tolerated the procedure well with no immediate adverse sequelae after the injection. A bandage was placed over the injection site.  Appropriate post injections instructions were given to the patient. 10/5/2021  4:36 PM    Kaveh Napoles ATC  [address] [address]    During this examination, I, [employer], functioned as a scribe for Dr. Laina Monte. The history taking and physical examination were performed by Dr. Ash Servin. All counseling during the appointment was performed between the patient and Dr. Ash Servin. 10/5/21    ______________  I, Dr. Laina Monte, personally performed the services described in this documentation as described by Kaveh Napoles ATC in my presence, and it is both accurate and complete. Samer LAURI Servin MD, PhD

## 2021-10-22 RX ORDER — MELOXICAM 15 MG/1
15 TABLET ORAL DAILY PRN
Qty: 90 TABLET | Refills: 0 | Status: SHIPPED | OUTPATIENT
Start: 2021-10-22 | End: 2021-11-23 | Stop reason: CLARIF

## 2023-03-24 ENCOUNTER — APPOINTMENT (OUTPATIENT)
Dept: GENERAL RADIOLOGY | Age: 73
End: 2023-03-24
Payer: MEDICARE

## 2023-03-24 ENCOUNTER — HOSPITAL ENCOUNTER (EMERGENCY)
Age: 73
Discharge: HOME | End: 2023-03-24
Attending: EMERGENCY MEDICINE
Payer: MEDICARE

## 2023-03-24 VITALS
RESPIRATION RATE: 18 BRPM | HEART RATE: 86 BPM | OXYGEN SATURATION: 97 % | DIASTOLIC BLOOD PRESSURE: 78 MMHG | WEIGHT: 210 LBS | TEMPERATURE: 98.1 F | HEIGHT: 70 IN | SYSTOLIC BLOOD PRESSURE: 163 MMHG | BODY MASS INDEX: 30.06 KG/M2

## 2023-03-24 DIAGNOSIS — S43.401A SPRAIN OF RIGHT SHOULDER, UNSPECIFIED SHOULDER SPRAIN TYPE, INITIAL ENCOUNTER: ICD-10-CM
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
## 2023-03-24 DIAGNOSIS — S46.001A INJURY OF RIGHT ROTATOR CUFF, INITIAL ENCOUNTER: ICD-10-CM

## 2023-03-24 PROCEDURE — 99283 EMERGENCY DEPT VISIT LOW MDM: CPT

## 2023-03-24 PROCEDURE — 73030 X-RAY EXAM OF SHOULDER: CPT

## 2023-03-24 PROCEDURE — 6370000000 HC RX 637 (ALT 250 FOR IP): Performed by: EMERGENCY MEDICINE

## 2023-03-24 RX ORDER — OXYCODONE HYDROCHLORIDE 5 MG/1
5 TABLET ORAL EVERY 8 HOURS PRN
Qty: 6 TABLET | Refills: 0 | Status: SHIPPED | OUTPATIENT
Start: 2023-03-24 | End: 2023-03-26

## 2023-03-24 RX ORDER — HYDROCODONE BITARTRATE AND ACETAMINOPHEN 5; 325 MG/1; MG/1
1 TABLET ORAL ONCE
Status: COMPLETED | OUTPATIENT
Start: 2023-03-24 | End: 2023-03-24

## 2023-03-24 RX ADMIN — HYDROCODONE BITARTRATE AND ACETAMINOPHEN 1 TABLET: 5; 325 TABLET ORAL at 13:57

## 2023-03-24 ASSESSMENT — PAIN DESCRIPTION - LOCATION
LOCATION: SHOULDER
LOCATION: SHOULDER

## 2023-03-24 ASSESSMENT — PAIN SCALES - GENERAL
PAINLEVEL_OUTOF10: 6
PAINLEVEL_OUTOF10: 8

## 2023-03-24 ASSESSMENT — PAIN DESCRIPTION - ORIENTATION: ORIENTATION: RIGHT

## 2023-03-24 ASSESSMENT — PAIN - FUNCTIONAL ASSESSMENT: PAIN_FUNCTIONAL_ASSESSMENT: 0-10

## 2023-03-24 ASSESSMENT — PAIN DESCRIPTION - DESCRIPTORS: DESCRIPTORS: ACHING

## 2023-03-24 NOTE — DISCHARGE INSTRUCTIONS
The x-ray did not show any fracture. You may have a rotator cuff injury or sprain. Please follow-up with orthopedic physician for further evaluation such as MRI of the shoulder to further evaluate for potential rotator cuff injury.

## 2023-03-24 NOTE — ED PROVIDER NOTES
Calm and cooperative. DIAGNOSTIC RESULTS   LABS:    Labs Reviewed - No data to display    When ordered only abnormal lab results are displayed. All other labs were within normal range or not returned as of this dictation. RADIOLOGY:   Non-plain film images such as CT, Ultrasound and MRI are read by the radiologist. Plain radiographic images are visualized and preliminarily interpreted by the ED Provider with the below findings:      Interpretation per the Radiologist below, if available at the time of this note:    XR SHOULDER RIGHT (MIN 2 VIEWS)   Final Result   No acute fracture or dislocation. Moderate osteoarthritis of the   glenohumeral joint. No results found. Bedside Ultrasound, as interpreted by me, if performed:    No results found. PROCEDURES     Unless otherwise noted below, none     Procedures    CRITICAL CARE TIME     I personally spent a total of 0 minutes of critical care time in obtaining history, performing a physical exam, bedside monitoring of interventions, collecting and interpreting tests and discussion with consultants but excluding time spent performing procedures, treating other patients and teaching time. PAST MEDICAL HISTORY      has a past medical history of Arthritis, Depression, Diabetes mellitus ([facility] [address] Risk for falls (07/01/14), S/P tubal ligation (6/1990), and Thyroid disease.      EMERGENCY DEPARTMENT COURSE and DIFFERENTIAL DIAGNOSIS/MDM:     Vitals:    Vitals:    03/24/23 1255   BP: (!) 163/78   Pulse: 86   Resp: 18   Temp: 98.1 °F (36.7 °C)   TempSrc: Oral   SpO2: 97%   Weight: 210 lb (95.3 kg)   Height: 5' 10\" (1.778 m)       Patient was treated with and given the following medications:  Medications   HYDROcodone-acetaminophen (NORCO) 5-325 MG per tablet 1 tablet (has no administration in time range)             Is this patient to be included in unspecified shoulder sprain type, initial encounter    3. Injury of right rotator cuff, initial encounter          DISPOSITION/PLAN     DISPOSITION Decision To Discharge 03/24/2023 01:46:47 PM      PATIENT REFERRED TO:  Jono Soni MD  [address] [facility] [address] [facility] MD Monique Velasquez [address] [facility] [address]  [phone]    Schedule an appointment as soon as possible for a visit       MD Monique Chavez [address] [facility] [address]  [phone]          DISCHARGE MEDICATIONS:  Patient was given scripts for the following medications. I counseled patient how to take these medications:  New Prescriptions    OXYCODONE (ROXICODONE) 5 MG IMMEDIATE RELEASE TABLET    Take 1 tablet by mouth every 8 hours as needed for Pain for up to 2 days. Intended supply: 3 days. Take lowest dose possible to manage pain Max Daily Amount: 15 mg       DISCONTINUED MEDICATIONS:  Discontinued Medications    No medications on file              (This chart was generated in part by using Dragon Dictation system and may contain errors related to that system including errors in grammar, punctuation, and spelling, as well as words and phrases that may be inappropriate.  If there are any questions or concerns please feel free to contact the dictating provider for clarification.)    MD Abrahan Saab MD  03/24/23 2554

## 2023-03-28 ENCOUNTER — TELEPHONE (OUTPATIENT)
Dept: ORTHOPEDIC SURGERY | Age: 73
End: 2023-03-28

## 2023-04-20 ENCOUNTER — OFFICE VISIT (OUTPATIENT)
Dept: ORTHOPEDIC SURGERY | Age: 73
End: 2023-04-20
Payer: MEDICARE

## 2023-04-20 VITALS — WEIGHT: 210 LBS | BODY MASS INDEX: 30.06 KG/M2 | HEIGHT: 70 IN

## 2023-04-20 DIAGNOSIS — S42.124A CLOSED NONDISPLACED FRACTURE OF ACROMIAL PROCESS OF RIGHT SCAPULA, INITIAL ENCOUNTER: ICD-10-CM

## 2023-04-20 DIAGNOSIS — M25.511 RIGHT SHOULDER PAIN, UNSPECIFIED CHRONICITY: Primary | ICD-10-CM

## 2023-04-20 DIAGNOSIS — M19.011 PRIMARY OSTEOARTHRITIS, RIGHT SHOULDER: ICD-10-CM

## 2023-04-20 PROCEDURE — G8427 DOCREV CUR MEDS BY ELIG CLIN: HCPCS | Performed by: ORTHOPAEDIC SURGERY

## 2023-04-20 PROCEDURE — 1036F TOBACCO NON-USER: CPT | Performed by: ORTHOPAEDIC SURGERY

## 2023-04-20 PROCEDURE — 1123F ACP DISCUSS/DSCN MKR DOCD: CPT | Performed by: ORTHOPAEDIC SURGERY

## 2023-04-20 PROCEDURE — L3670 SO ACRO/CLAV CAN WEB PRE OTS: HCPCS | Performed by: ORTHOPAEDIC SURGERY

## 2023-04-20 PROCEDURE — 99214 OFFICE O/P EST MOD 30 MIN: CPT | Performed by: ORTHOPAEDIC SURGERY

## 2023-04-20 PROCEDURE — G8417 CALC BMI ABV UP PARAM F/U: HCPCS | Performed by: ORTHOPAEDIC SURGERY

## 2023-04-20 PROCEDURE — G8400 PT W/DXA NO RESULTS DOC: HCPCS | Performed by: ORTHOPAEDIC SURGERY

## 2023-04-20 PROCEDURE — 3017F COLORECTAL CA SCREEN DOC REV: CPT | Performed by: ORTHOPAEDIC SURGERY

## 2023-04-20 PROCEDURE — 1090F PRES/ABSN URINE INCON ASSESS: CPT | Performed by: ORTHOPAEDIC SURGERY

## 2023-04-20 RX ORDER — ACETAMINOPHEN 500 MG
500 TABLET ORAL EVERY 6 HOURS PRN
COMMUNITY

## 2023-05-02 ENCOUNTER — TELEPHONE (OUTPATIENT)
Dept: ORTHOPEDIC SURGERY | Age: 73
End: 2023-05-02

## 2023-05-03 DIAGNOSIS — S42.124A CLOSED NONDISPLACED FRACTURE OF ACROMIAL PROCESS OF RIGHT SCAPULA, INITIAL ENCOUNTER: Primary | ICD-10-CM

## 2023-05-03 RX ORDER — HYDROCODONE BITARTRATE AND ACETAMINOPHEN 5; 325 MG/1; MG/1
1 TABLET ORAL EVERY 6 HOURS PRN
Qty: 20 TABLET | Refills: 0 | Status: SHIPPED | OUTPATIENT
Start: 2023-05-03 | End: 2023-05-08

## 2023-05-10 ENCOUNTER — TELEPHONE (OUTPATIENT)
Dept: ORTHOPEDIC SURGERY | Age: 73
End: 2023-05-10

## 2023-12-18 ENCOUNTER — TELEPHONE (OUTPATIENT)
Dept: ORTHOPEDIC SURGERY | Age: 73
End: 2023-12-18

## 2023-12-18 NOTE — TELEPHONE ENCOUNTER
General Question     Subject: XR ON DISK FOR P/U  Patient and /or Facility Request: Denisa Newsome  Contact Number: [phone]     Pt NEEDS ALL IMAGES ON A DISK FOR P/U. PLEASE CALL WHEN THEY ARE READY.

## (undated) DEVICE — HANDPIECE SUCTION TUBING INTERPULSE 10FT

## (undated) DEVICE — GLOVE SURG SZ 75 L12IN FNGR THK87MIL WHT LTX FREE

## (undated) DEVICE — Z CONVERTED USE 2271043 CONTAINER SPEC COLL 4OZ SCR ON LID PEEL PCH

## (undated) DEVICE — SUTURE MCRYL SZ 2-0 L18IN ABSRB VLT L36MM CT-1 1/2 CIR Y739D

## (undated) DEVICE — COVER LT HNDL BLU PLAS

## (undated) DEVICE — DRESSING THERABOND 3D ANTIMIC CNTCT SYS 15INCHX10INCH

## (undated) DEVICE — GLOVE SURG SZ 8 L12IN FNGR THK79MIL GRN LTX FREE

## (undated) DEVICE — 3M™ STERI-DRAPE™ INSTRUMENT POUCH 1018: Brand: STERI-DRAPE™

## (undated) DEVICE — SYSTEM SKIN CLSR 22CM DERMBND PRINEO

## (undated) DEVICE — GLOVE ORANGE PI 8   MSG9080

## (undated) DEVICE — CHLORAPREP 26ML ORANGE

## (undated) DEVICE — DUAL CUT SAGITTAL BLADE

## (undated) DEVICE — INTENDED USE FOR SURGICAL MARKING ON INTACT SKIN, ALSO PROVIDES A PERMANENT METHOD OF IDENTIFYING OBJECTS IN THE OPERATING ROOM: Brand: WRITESITE® PLUS MINI PREP RESISTANT MARKER

## (undated) DEVICE — DECANTER BAG 9": Brand: MEDLINE INDUSTRIES, INC.

## (undated) DEVICE — SURE SET-DOUBLE BASIN-LF: Brand: MEDLINE INDUSTRIES, INC.

## (undated) DEVICE — SOLUTION IV 1000ML 0.9% SOD CHL

## (undated) DEVICE — PEEL-AWAY HOOD: Brand: FLYTE, SURGICOOL

## (undated) DEVICE — ORTHO PRE OP PACK: Brand: MEDLINE INDUSTRIES, INC.

## (undated) DEVICE — GOWN,SIRUS,POLYRNF,BRTHSLV,XLN/XL,20/CS: Brand: MEDLINE

## (undated) DEVICE — 3M™ COBAN™ NL STERILE NON-LATEX SELF-ADHERENT WRAP, 2084S, 4 IN X 5 YD (10 CM X 4,5 M), 18 ROLLS/CASE: Brand: 3M™ COBAN™

## (undated) DEVICE — 3M™ WARMING BLANKET, UPPER BODY, 10 PER CASE, 42268: Brand: BAIR HUGGER™

## (undated) DEVICE — PACK PROCEDURE SURG TOT HIP

## (undated) DEVICE — 450 ML BOTTLE OF 0.05% CHLORHEXIDINE GLUCONATE IN 99.95% STERILE WATER FOR IRRIGATION, USP AND APPLICATOR.: Brand: IRRISEPT ANTIMICROBIAL WOUND LAVAGE

## (undated) DEVICE — DRAPE C ARM UNIV W41XL74IN CLR PLAS XR VELC CLSR POLY STRP

## (undated) DEVICE — BIPOLAR SEALER 23-112-1 AQM 6.0: Brand: AQUAMANTYS ®

## (undated) DEVICE — TURNOVER KIT RM INF CTRL TECH

## (undated) DEVICE — COAXIAL HIGH FLOW TIP WITH SOFT SHIELD

## (undated) DEVICE — SUTURE MCRYL SZ 0 L18IN ABSRB VLT L36MM CT-1 1/2 CIR Y740D

## (undated) DEVICE — 1010 S-DRAPE TOWEL DRAPE 10/BX: Brand: STERI-DRAPE™

## (undated) DEVICE — SUTURE STRATAFIX SPRL SZ 1 L14IN ABSRB VLT L48CM CTX 1/2 SXPD2B405

## (undated) DEVICE — STANDARD HYPODERMIC NEEDLE,POLYPROPYLENE HUB: Brand: MONOJECT

## (undated) DEVICE — YANKAUER,OPEN TIP,W/O VENT,STERILE: Brand: MEDLINE INDUSTRIES, INC.

## (undated) DEVICE — COUNTER NDL 40 COUNT HLD 70 NUM FOAM BLK SGL MAG W BLDE REMV

## (undated) DEVICE — PLATE ES AD W 9FT CRD 2

## (undated) DEVICE — SUTURE ETHBND EXCEL SZ 0 L18IN NONABSORBABLE GRN L36MM CT-1 CX21D

## (undated) DEVICE — CUFF RESTRN WRST OR ANK 45FT AD FOAM

## (undated) DEVICE — MARKER SURG SKIN UTIL BLK REG TIP NONSMEARING W/ 6IN RUL

## (undated) DEVICE — GARMENT,MEDLINE,DVT,INT,CALF,MED, GEN2: Brand: MEDLINE

## (undated) DEVICE — REFLECTION FLEXIBLE DRILL 35MM: Brand: REFLECTION